# Patient Record
Sex: FEMALE | Race: WHITE | NOT HISPANIC OR LATINO | URBAN - METROPOLITAN AREA
[De-identification: names, ages, dates, MRNs, and addresses within clinical notes are randomized per-mention and may not be internally consistent; named-entity substitution may affect disease eponyms.]

---

## 2021-01-29 ENCOUNTER — APPOINTMENT (OUTPATIENT)
Dept: WOMENS IMAGING | Facility: HOSPITAL | Age: 31
End: 2021-01-29

## 2021-01-29 PROCEDURE — 77067 SCR MAMMO BI INCL CAD: CPT | Performed by: RADIOLOGY

## 2022-05-27 ENCOUNTER — TELEMEDICINE (OUTPATIENT)
Dept: FAMILY MEDICINE CLINIC | Facility: CLINIC | Age: 32
End: 2022-05-27

## 2022-05-27 DIAGNOSIS — R10.2 PELVIC PAIN: ICD-10-CM

## 2022-05-27 DIAGNOSIS — Z87.42 HISTORY OF OVARIAN CYST: ICD-10-CM

## 2022-05-27 DIAGNOSIS — R10.11 RUQ ABDOMINAL PAIN: Primary | ICD-10-CM

## 2022-05-27 PROBLEM — F17.200 TOBACCO DEPENDENCE SYNDROME: Status: ACTIVE | Noted: 2022-05-27

## 2022-05-27 PROCEDURE — 99215 OFFICE O/P EST HI 40 MIN: CPT | Performed by: FAMILY MEDICINE

## 2022-05-27 RX ORDER — ASCORBIC ACID, CHOLECALCIFEROL, .ALPHA.-TOCOPHEROL ACETATE, DL-, PYRIDOXINE HYDROCHLORIDE, FOLIC ACID, CYANOCOBALAMIN, BIOTIN, CALCIUM CARBONATE, FERROUS ASPARTO GLYCINATE, IRON, POTASSIUM IODIDE, MAGNESIUM OXIDE, DOCONEXENT AND LOWBUSH BLUEBERRY 60; 1000; 10; 26; 400; 13; 280; 80; 9; 9; 150; 25; 350; 25; 600 MG/1; [IU]/1; [IU]/1; MG/1; UG/1; UG/1; UG/1; MG/1; MG/1; MG/1; UG/1; MG/1; MG/1; MG/1; UG/1
CAPSULE, GELATIN COATED ORAL
COMMUNITY
Start: 2022-04-12 | End: 2022-12-06 | Stop reason: SDUPTHER

## 2022-05-27 RX ORDER — LEVONORGESTREL AND ETHINYL ESTRADIOL 0.1-0.02MG
KIT ORAL
COMMUNITY
Start: 2022-05-06

## 2022-05-31 NOTE — PROGRESS NOTES
Telehealth E-Visit      Date: 2022   Patient Name: Fanny Pizano  : 1990   MRN: [id number]     Chief Complaint:    Chief Complaint   Patient presents with   • Abdominal Pain     Telehealth Visit completed via My Chart for video conferencing, patient here for telehealth visit. Patient understands the limitations of the visit with telehealth given limitations in the exam findings but patient is agreeable to this telemedicine visit due to concerns with COVID 19 exposure if patient were to be present at the office at this time    I have reviewed the E-Visit questionnaire and the patient's answers, my assessment and plan are listed below.     History of Present Illness: Fanny Pizano is a 31 y.o. female who is complaining of intermittent right upper quadrant abdominal pain which is gotten worse.  She describes the pain to be in intermittent dull and achy there are some foods which seem to make it worse.  No vomiting some mild nausea and discomfort.  She is mostly concerned about gallstones.  Has no history of chronic NSAID use no history of stomach ulcers no melena no hematochezia or hematemesis.    She also has a history of ovarian cyst.  She notes right pelvic pain which has started over the past few weeks and has been intermittent she is on oral contraceptive pills which seems to keep her periods rather regular but her concern is that the history of the ovarian cyst could potentially with the pain correlate to her repetitive cyst being there.    Subjective      Review of Systems:   Review of Systems   Constitutional: Negative.    HENT: Negative.    Eyes: Negative.    Respiratory: Negative.    Cardiovascular: Negative.    Gastrointestinal: Positive for abdominal pain and nausea.   Endocrine: Negative.    Genitourinary: Positive for pelvic pain.   Musculoskeletal: Negative.    Skin: Negative.    Allergic/Immunologic: Negative.    Neurological: Negative.    Hematological: Negative.     Psychiatric/Behavioral: Negative.        I have reviewed and the following portions of the patient's history were updated as appropriate: past family history, past medical history, past social history, past surgical history and problem list.    Medications:     Current Outpatient Medications:   •  Falmina 0.1-20 MG-MCG per tablet, , Disp: , Rfl:   •  Multiple Minerals-Vitamins (DOLOMITE PLUS VITAMINS A AND D PO), Every 6 (Six) Hours., Disp: , Rfl:   •  Mufbtf-GfMak-XvSmu-Meth-FA-DHA (Prenate Mini) 18-0.6-0.4-350 MG capsule, , Disp: , Rfl:   •  sertraline (ZOLOFT) 50 MG tablet, , Disp: , Rfl:     Allergies:   No Known Allergies    Objective     Physical Exam:  Vital Signs: There were no vitals filed for this visit.  There is no height or weight on file to calculate BMI.    Physical Exam  Vitals reviewed: Exam is done and limited to telemedicine due to COVID.           Assessment / Plan      Assessment/Plan:   Diagnoses and all orders for this visit:    1. RUQ abdominal pain (Primary)  -     US Gallbladder; Future  -     Hemoglobin A1c; Future  -     Comprehensive Metabolic Panel; Future  -     CK; Future  -     T4, Free; Future  -     TSH; Future  -     Lipid Panel; Future  Patient does understand exam is limited as we are doing over telemedicine with what she is describing and with the location of the pain I suspect possible cholelithiasis we will get a gallbladder ultrasound and proceed with course if pain worsens in the meantime I have instructed her to go to the ER also get blood work and follow-up    2. Pelvic pain  -     US Non-ob Transvaginal; Future  With her history of ovarian cyst and pelvic pain we will get a transvaginal ultrasound to assess if there is a cyst may need to consider switching her to a different oral contraceptive pill in the meantime of course she has been advised there is always a concern for ovarian torsion to monitor the pain if it gets worse go to the ER.    3. History of ovarian  cyst  -     US Non-ob Transvaginal; Future         Follow Up:   No follow-ups on file.      45   minutes were spent reviewing the patient's questionnaire, formulating a treatment plan, and relaying information to the patient via [email or web address].    Ailyn Barraza DO  [facility]   05/31/22  08:24 EDT

## 2022-06-03 ENCOUNTER — OFFICE VISIT (OUTPATIENT)
Dept: FAMILY MEDICINE CLINIC | Facility: CLINIC | Age: 32
End: 2022-06-03

## 2022-06-03 VITALS
HEIGHT: 67 IN | BODY MASS INDEX: 33.81 KG/M2 | SYSTOLIC BLOOD PRESSURE: 134 MMHG | HEART RATE: 64 BPM | OXYGEN SATURATION: 98 % | DIASTOLIC BLOOD PRESSURE: 84 MMHG | WEIGHT: 215.4 LBS

## 2022-06-03 DIAGNOSIS — R10.11 RUQ ABDOMINAL PAIN: ICD-10-CM

## 2022-06-03 DIAGNOSIS — R10.2 PELVIC PAIN: ICD-10-CM

## 2022-06-03 DIAGNOSIS — Z00.00 ENCOUNTER FOR PREVENTATIVE ADULT HEALTH CARE EXAMINATION: Primary | ICD-10-CM

## 2022-06-03 DIAGNOSIS — Z87.42 HISTORY OF OVARIAN CYST: ICD-10-CM

## 2022-06-03 DIAGNOSIS — F33.1 MODERATE EPISODE OF RECURRENT MAJOR DEPRESSIVE DISORDER: ICD-10-CM

## 2022-06-03 PROCEDURE — 99395 PREV VISIT EST AGE 18-39: CPT | Performed by: FAMILY MEDICINE

## 2022-06-03 PROCEDURE — 36415 COLL VENOUS BLD VENIPUNCTURE: CPT | Performed by: FAMILY MEDICINE

## 2022-06-03 NOTE — PROGRESS NOTES
"Chief Complaint  Annual Exam    Subjective          Fanny Pizano presents to [facility] for preventative yearly exam.   Patient is a 31-year-old female who presents for physical exam she is up-to-date with Pap smear mammogram    And her last appointment we discussed a right upper quadrant abdominal pain she is pending getting scheduled for a gallbladder ultrasound she does endorse the pain is worse after eating certain meals.  She occasionally has some nausea no vomiting.    She is also describing some pelvic pain has a history of ovarian cyst she notes the pain is worse during her periods.  It is mostly localized to the left pelvic region.  She is on OCPs unsure if this is working appropriately        Objective   Vital Signs:   /84   Pulse 64   Ht 170.2 cm (67\")   Wt 97.7 kg (215 lb 6.4 oz)   SpO2 98%   BMI 33.74 kg/m²     Body mass index is 33.74 kg/m².    Predictive Model Details   No score data available for Risk of Fall        PHQ-9 Depression Screening  Little interest or pleasure in doing things?     Feeling down, depressed, or hopeless?     Trouble falling or staying asleep, or sleeping too much?     Feeling tired or having little energy?     Poor appetite or overeating?     Feeling bad about yourself - or that you are a failure or have let yourself or your family down?     Trouble concentrating on things, such as reading the newspaper or watching television?     Moving or speaking so slowly that other people could have noticed? Or the opposite - being so fidgety or restless that you have been moving around a lot more than usual?     Thoughts that you would be better off dead, or of hurting yourself in some way?     PHQ-9 Total Score     If you checked off any problems, how difficult have these problems made it for you to do your work, take care of things at home, or get along with other people?       Immunization History   Administered Date(s) Administered   • " COVID-19 (MODERNA) 1st, 2nd, 3rd Dose Only 06/09/2021, 07/07/2021   • DTaP [birth year], 02/08/1989, 11/21/1989, 08/04/1993   • Hepatitis B 07/16/1999, 08/13/1999, 01/11/2000   • IPV 1990, 1990, 1990, 08/04/1993   • Influenza, Unspecified 01/10/2019   • MMR 11/21/1989, 07/11/1999   • Td 07/07/2003   • Varicella 08/07/1995       Review of Systems   Constitutional: Negative.    HENT: Negative.    Eyes: Negative.    Respiratory: Negative.    Cardiovascular: Negative.    Gastrointestinal: Positive for abdominal pain and nausea.   Endocrine: Negative.    Genitourinary: Positive for pelvic pain and pelvic pressure.   Musculoskeletal: Negative.    Skin: Negative.    Allergic/Immunologic: Negative.    Neurological: Negative.    Hematological: Negative.    Psychiatric/Behavioral: Negative.        Past History:  Medical History: has a past medical history of Strabismus and Tobacco dependence syndrome.   Surgical History: has a past surgical history that includes Strabismus surgery.   Family History: family history includes Breast cancer in her paternal grandmother; Coronary artery disease in an other family member; Diabetes in an other family member; Graves' disease in her mother.   Social History: reports that she quit smoking about 4 years ago. She started smoking about 17 years ago. She has a 13.00 pack-year smoking history. She has never used smokeless tobacco. She reports that she does not use drugs.      Current Outpatient Medications:   •  Falmina 0.1-20 MG-MCG per tablet, , Disp: , Rfl:   •  Qkxzjx-GdXsq-NnDcn-Meth-FA-DHA (Prenate Mini) 18-0.6-0.4-350 MG capsule, , Disp: , Rfl:   •  sertraline (ZOLOFT) 50 MG tablet, , Disp: , Rfl:     Allergies: Patient has no known allergies.    Physical Exam  Constitutional:       Appearance: Normal appearance. She is normal weight.   HENT:      Head: Normocephalic and atraumatic.   Eyes:      Conjunctiva/sclera: Conjunctivae normal.   Cardiovascular:       Rate and Rhythm: Normal rate and regular rhythm.   Pulmonary:      Effort: Pulmonary effort is normal.      Breath sounds: Normal breath sounds.   Abdominal:      Comments: She does have some tenderness in the right upper quadrant region no evidence of acute abdomen no rebound guarding or rigidity.  Negative Guo sign.   Musculoskeletal:         General: Normal range of motion.      Cervical back: Normal range of motion and neck supple.   Skin:     General: Skin is warm and dry.   Neurological:      General: No focal deficit present.      Mental Status: She is alert and oriented to person, place, and time. Mental status is at baseline.   Psychiatric:         Mood and Affect: Mood normal.         Behavior: Behavior normal.         Thought Content: Thought content normal.         Judgment: Judgment normal.          Result Review :              Counseling/anticipatory guidance:  Patient has been counseled on nutrition, Family planning/contraception, physical activity, health and weight, injury prevention, misuse of tobacco, alcohol and drugs, sexual behavior, dental health, mental health, immunizations and screening.  Patient has been given counseling and guidance on the importance of breast cancer and self breast exams.     Assessment and Plan    Diagnoses and all orders for this visit:    1. Encounter for preventative adult health care examination (Primary)  -     Hemoglobin A1c; Future  -     Comprehensive Metabolic Panel; Future  -     CK; Future  -     CBC Auto Differential; Future  -     TSH; Future  -     T4, Free; Future  -     Lipid Panel; Future  -     Lipid Panel  -     T4, Free  -     TSH  -     CBC Auto Differential  -     CK  -     Comprehensive Metabolic Panel  -     Hemoglobin A1c  Basic blood work and follow-up    2. RUQ abdominal pain  She is pending ultrasound right upper quadrant gallbladder we will follow-up with this if there is any evidence of gallstones along with her abdominal pain we will  then proceed to refer her to surgery to discuss removal    3. Pelvic pain  We will do a transvaginal ultrasound if there is any evidence of ovarian cyst we will need to monitor if not may consider changing her OCP    4. History of ovarian cyst  As above  She has however been told of any sharp stabbing pelvic pain that does not improve go to the ER    5. Moderate episode of recurrent major depressive disorder (HCC)  Stable continue meds and monitoring      PATIENT HAS BEEN ADVISED WHILE ON NEW MEDICATION PRESCRIBED IT IS IMPORTANT TO USE BACK UP CONTRACEPTION OR BACK UP BIRTH CONTROL AS THE USE OF THIS MEDICATION WITH PREGNANCY COULD CAUSE POTENTIAL RISKS IF PATIENT DOES BECOME PREGNANT.    MEDICATION SIDE EFFECTS AND RISKS HAVE BEEN DISCUSSED WITH PATIENT. PATIENT NOTES UNDERSTANDING. IF ANY CONCERN OR QUESTION REGARDING MEDICATION PLEASE CONTACT.         Follow Up   No follow-ups on file.  Patient was given instructions and counseling regarding her condition or for health maintenance advice. Please see specific information pulled into the AVS if appropriate.     Ailyn Barraza DO

## 2022-06-04 LAB
ALBUMIN SERPL-MCNC: 4.6 G/DL (ref 3.8–4.8)
ALBUMIN/GLOB SERPL: 1.8 {RATIO} (ref 1.2–2.2)
ALP SERPL-CCNC: 53 IU/L (ref 44–121)
ALT SERPL-CCNC: 18 IU/L (ref 0–32)
AST SERPL-CCNC: 17 IU/L (ref 0–40)
BASOPHILS # BLD AUTO: 0.1 X10E3/UL (ref 0–0.2)
BASOPHILS NFR BLD AUTO: 1 %
BILIRUB SERPL-MCNC: 0.3 MG/DL (ref 0–1.2)
BUN SERPL-MCNC: 13 MG/DL (ref 6–20)
BUN/CREAT SERPL: 15 (ref 9–23)
CALCIUM SERPL-MCNC: 9 MG/DL (ref 8.7–10.2)
CHLORIDE SERPL-SCNC: 106 MMOL/L (ref 96–106)
CHOLEST SERPL-MCNC: 234 MG/DL (ref 100–199)
CK SERPL-CCNC: 167 U/L (ref 32–182)
CO2 SERPL-SCNC: 21 MMOL/L (ref 20–29)
CREAT SERPL-MCNC: 0.84 MG/DL (ref 0.57–1)
EGFRCR SERPLBLD CKD-EPI 2021: 94 ML/MIN/1.73
EOSINOPHIL # BLD AUTO: 0.1 X10E3/UL (ref 0–0.4)
EOSINOPHIL NFR BLD AUTO: 1 %
ERYTHROCYTE [DISTWIDTH] IN BLOOD BY AUTOMATED COUNT: 11.7 % (ref 11.7–15.4)
GLOBULIN SER CALC-MCNC: 2.5 G/DL (ref 1.5–4.5)
GLUCOSE SERPL-MCNC: 83 MG/DL (ref 65–99)
HBA1C MFR BLD: 5.5 % (ref 4.8–5.6)
HCT VFR BLD AUTO: 45.1 % (ref 34–46.6)
HDLC SERPL-MCNC: 38 MG/DL
HGB BLD-MCNC: 14.9 G/DL (ref 11.1–15.9)
IMM GRANULOCYTES # BLD AUTO: 0 X10E3/UL (ref 0–0.1)
IMM GRANULOCYTES NFR BLD AUTO: 0 %
LDLC SERPL CALC-MCNC: 176 MG/DL (ref 0–99)
LYMPHOCYTES # BLD AUTO: 2.4 X10E3/UL (ref 0.7–3.1)
LYMPHOCYTES NFR BLD AUTO: 34 %
MCH RBC QN AUTO: 31.7 PG (ref 26.6–33)
MCHC RBC AUTO-ENTMCNC: 33 G/DL (ref 31.5–35.7)
MCV RBC AUTO: 96 FL (ref 79–97)
MONOCYTES # BLD AUTO: 0.3 X10E3/UL (ref 0.1–0.9)
MONOCYTES NFR BLD AUTO: 5 %
NEUTROPHILS # BLD AUTO: 4.1 X10E3/UL (ref 1.4–7)
NEUTROPHILS NFR BLD AUTO: 59 %
PLATELET # BLD AUTO: 278 X10E3/UL (ref 150–450)
POTASSIUM SERPL-SCNC: 4.4 MMOL/L (ref 3.5–5.2)
PROT SERPL-MCNC: 7.1 G/DL (ref 6–8.5)
RBC # BLD AUTO: 4.7 X10E6/UL (ref 3.77–5.28)
SODIUM SERPL-SCNC: 140 MMOL/L (ref 134–144)
T4 FREE SERPL-MCNC: 1.35 NG/DL (ref 0.82–1.77)
TRIGL SERPL-MCNC: 110 MG/DL (ref 0–149)
TSH SERPL DL<=0.005 MIU/L-ACNC: 1.21 UIU/ML (ref 0.45–4.5)
VLDLC SERPL CALC-MCNC: 20 MG/DL (ref 5–40)
WBC # BLD AUTO: 7 X10E3/UL (ref 3.4–10.8)

## 2022-06-06 DIAGNOSIS — R10.11 RUQ ABDOMINAL PAIN: ICD-10-CM

## 2022-06-07 ENCOUNTER — OFFICE VISIT (OUTPATIENT)
Dept: FAMILY MEDICINE CLINIC | Facility: CLINIC | Age: 32
End: 2022-06-07

## 2022-06-07 VITALS
DIASTOLIC BLOOD PRESSURE: 84 MMHG | HEART RATE: 82 BPM | HEIGHT: 67 IN | BODY MASS INDEX: 33.62 KG/M2 | OXYGEN SATURATION: 98 % | WEIGHT: 214.2 LBS | SYSTOLIC BLOOD PRESSURE: 122 MMHG

## 2022-06-07 DIAGNOSIS — K76.0 HEPATIC STEATOSIS: Primary | ICD-10-CM

## 2022-06-07 DIAGNOSIS — E78.2 MIXED HYPERLIPIDEMIA: ICD-10-CM

## 2022-06-07 DIAGNOSIS — K59.04 CHRONIC IDIOPATHIC CONSTIPATION: ICD-10-CM

## 2022-06-07 PROCEDURE — 99214 OFFICE O/P EST MOD 30 MIN: CPT | Performed by: FAMILY MEDICINE

## 2022-06-07 RX ORDER — ATORVASTATIN CALCIUM 40 MG/1
40 TABLET, FILM COATED ORAL DAILY
Qty: 90 TABLET | Refills: 2 | Status: SHIPPED | OUTPATIENT
Start: 2022-06-07 | End: 2022-09-14

## 2022-06-07 NOTE — PROGRESS NOTES
"Chief Complaint  CT Follow up    Subjective          Fanny Pizano presents to [facility]  Patient is a 31-year-old female who presents for imaging follow-up.  At her last appointment we had discussed her right upper quadrant abdominal pain we had considered cholelithiasis she does note that her  had hepatic steatosis along with her mother and grandmother.  She does admit to some mild carbohydrate intake but not excessive.  She is wanting to think about pregnancy in the next 3 to 6 months.  She was wanting to lose some weight on her own.    She struggles with significant constipation has tried over-the-counter medications which does not seem to help.      Objective   Vital Signs:   /84   Pulse 82   Ht 170.2 cm (67\")   Wt 97.2 kg (214 lb 3.2 oz)   SpO2 98%   BMI 33.55 kg/m²     Body mass index is 33.55 kg/m².    Review of Systems   Constitutional: Negative.    HENT: Negative.    Eyes: Negative.    Respiratory: Negative.    Cardiovascular: Negative.    Gastrointestinal: Positive for abdominal distention and abdominal pain.   Endocrine: Negative.    Genitourinary: Negative.    Musculoskeletal: Negative.    Skin: Negative.    Allergic/Immunologic: Negative.    Neurological: Negative.    Hematological: Negative.    Psychiatric/Behavioral: Negative.        Past History:  Medical History: has a past medical history of Strabismus and Tobacco dependence syndrome.   Surgical History: has a past surgical history that includes Strabismus surgery.   Family History: family history includes Breast cancer in her paternal grandmother; Coronary artery disease in an other family member; Diabetes in an other family member; Graves' disease in her mother.   Social History: reports that she quit smoking about 4 years ago. She started smoking about 17 years ago. She has a 13.00 pack-year smoking history. She has never used smokeless tobacco. She reports previous alcohol use. She reports " that she does not use drugs.      Current Outpatient Medications:   •  Falmina 0.1-20 MG-MCG per tablet, , Disp: , Rfl:   •  Pytjdw-YwFov-UgXek-Meth-FA-DHA (Prenate Mini) 18-0.6-0.4-350 MG capsule, , Disp: , Rfl:   •  sertraline (ZOLOFT) 50 MG tablet, , Disp: , Rfl:   •  atorvastatin (Lipitor) 40 MG tablet, Take 1 tablet by mouth Daily., Disp: 90 tablet, Rfl: 2  •  linaclotide (Linzess) 72 MCG capsule capsule, Take 1 capsule by mouth Every Morning Before Breakfast., Disp: 30 capsule, Rfl: 3    Allergies: Patient has no known allergies.    Physical Exam  Constitutional:       Appearance: Normal appearance. She is normal weight.   HENT:      Head: Normocephalic and atraumatic.   Eyes:      Conjunctiva/sclera: Conjunctivae normal.   Cardiovascular:      Rate and Rhythm: Normal rate and regular rhythm.   Pulmonary:      Effort: Pulmonary effort is normal.      Breath sounds: Normal breath sounds.   Musculoskeletal:         General: Normal range of motion.      Cervical back: Normal range of motion and neck supple.   Skin:     General: Skin is warm and dry.   Neurological:      General: No focal deficit present.      Mental Status: She is alert and oriented to person, place, and time. Mental status is at baseline.   Psychiatric:         Mood and Affect: Mood normal.         Behavior: Behavior normal.         Thought Content: Thought content normal.         Judgment: Judgment normal.          Result Review :                   Assessment and Plan    Diagnoses and all orders for this visit:    1. Hepatic steatosis (Primary)  Have discussed with patient evidence of mild hepatic steatosis at this time.  No evidence of cholelithiasis.  I have strongly recommended modifying her diet addition of exercise.  She does have significantly elevated LDL I suspect this is secondary to dietary intake along with family history.  She also did discuss wanting to get pregnant in the next 3 to 6 months we discussed putting that off till about  3 months therefore doing a 3-month trial of a statin ensuring that she stays on her birth control was discussed.  She is okay with this to help bring her LDL down along with the dietary modifications.  We will plan on rechecking her blood work in 3 months and reassess.  At that time if everything looks better then she will attempt to get pregnant.  In the meantime she has been told to watch for symptoms of any sharp stabbing abdominal pain return for evaluation    2. Mixed hyperlipidemia  As above    3. Chronic idiopathic constipation  We will initiate trial of Linzess for her to try.    Other orders  -     linaclotide (Linzess) 72 MCG capsule capsule; Take 1 capsule by mouth Every Morning Before Breakfast.  Dispense: 30 capsule; Refill: 3  -     atorvastatin (Lipitor) 40 MG tablet; Take 1 tablet by mouth Daily.  Dispense: 90 tablet; Refill: 2      PATIENT HAS BEEN ADVISED WHILE ON NEW MEDICATION PRESCRIBED IT IS IMPORTANT TO USE BACK UP CONTRACEPTION OR BACK UP BIRTH CONTROL AS THE USE OF THIS MEDICATION WITH PREGNANCY COULD CAUSE POTENTIAL RISKS IF PATIENT DOES BECOME PREGNANT.    MEDICATION SIDE EFFECTS AND RISKS HAVE BEEN DISCUSSED WITH PATIENT. PATIENT NOTES UNDERSTANDING. IF ANY CONCERN OR QUESTION REGARDING MEDICATION PLEASE CONTACT.     Follow Up   No follow-ups on file.  Patient was given instructions and counseling regarding her condition or for health maintenance advice. Please see specific information pulled into the AVS if appropriate.     Ailyn Barraza DO

## 2022-09-07 ENCOUNTER — LAB (OUTPATIENT)
Dept: FAMILY MEDICINE CLINIC | Facility: CLINIC | Age: 32
End: 2022-09-07

## 2022-09-07 DIAGNOSIS — Z79.899 ENCOUNTER FOR LONG-TERM (CURRENT) USE OF OTHER MEDICATIONS: Primary | ICD-10-CM

## 2022-09-07 DIAGNOSIS — Z11.59 ENCOUNTER FOR HEPATITIS C SCREENING TEST FOR LOW RISK PATIENT: ICD-10-CM

## 2022-09-07 PROCEDURE — 36415 COLL VENOUS BLD VENIPUNCTURE: CPT | Performed by: FAMILY MEDICINE

## 2022-09-08 LAB
ALBUMIN SERPL-MCNC: 4.4 G/DL (ref 3.8–4.8)
ALBUMIN/GLOB SERPL: 2.1 {RATIO} (ref 1.2–2.2)
ALP SERPL-CCNC: 59 IU/L (ref 44–121)
ALT SERPL-CCNC: 25 IU/L (ref 0–32)
AST SERPL-CCNC: 23 IU/L (ref 0–40)
BASOPHILS # BLD AUTO: 0.1 X10E3/UL (ref 0–0.2)
BASOPHILS NFR BLD AUTO: 1 %
BILIRUB SERPL-MCNC: 0.2 MG/DL (ref 0–1.2)
BUN SERPL-MCNC: 9 MG/DL (ref 6–20)
BUN/CREAT SERPL: 10 (ref 9–23)
CALCIUM SERPL-MCNC: 9.2 MG/DL (ref 8.7–10.2)
CHLORIDE SERPL-SCNC: 104 MMOL/L (ref 96–106)
CHOLEST SERPL-MCNC: 109 MG/DL (ref 100–199)
CO2 SERPL-SCNC: 20 MMOL/L (ref 20–29)
CREAT SERPL-MCNC: 0.89 MG/DL (ref 0.57–1)
EGFRCR-CYS SERPLBLD CKD-EPI 2021: 87 ML/MIN/1.73
EOSINOPHIL # BLD AUTO: 0.2 X10E3/UL (ref 0–0.4)
EOSINOPHIL NFR BLD AUTO: 2 %
ERYTHROCYTE [DISTWIDTH] IN BLOOD BY AUTOMATED COUNT: 11.8 % (ref 11.7–15.4)
GLOBULIN SER CALC-MCNC: 2.1 G/DL (ref 1.5–4.5)
GLUCOSE SERPL-MCNC: 91 MG/DL (ref 65–99)
HBA1C MFR BLD: 5.6 % (ref 4.8–5.6)
HCT VFR BLD AUTO: 41.9 % (ref 34–46.6)
HCV AB S/CO SERPL IA: <0.1 S/CO RATIO (ref 0–0.9)
HDLC SERPL-MCNC: 38 MG/DL
HGB BLD-MCNC: 14.2 G/DL (ref 11.1–15.9)
IMM GRANULOCYTES # BLD AUTO: 0 X10E3/UL (ref 0–0.1)
IMM GRANULOCYTES NFR BLD AUTO: 0 %
LDLC SERPL CALC-MCNC: 56 MG/DL (ref 0–99)
LYMPHOCYTES # BLD AUTO: 2.4 X10E3/UL (ref 0.7–3.1)
LYMPHOCYTES NFR BLD AUTO: 24 %
MCH RBC QN AUTO: 32.1 PG (ref 26.6–33)
MCHC RBC AUTO-ENTMCNC: 33.9 G/DL (ref 31.5–35.7)
MCV RBC AUTO: 95 FL (ref 79–97)
MONOCYTES # BLD AUTO: 0.5 X10E3/UL (ref 0.1–0.9)
MONOCYTES NFR BLD AUTO: 5 %
NEUTROPHILS # BLD AUTO: 6.7 X10E3/UL (ref 1.4–7)
NEUTROPHILS NFR BLD AUTO: 68 %
PLATELET # BLD AUTO: 240 X10E3/UL (ref 150–450)
POTASSIUM SERPL-SCNC: 4.5 MMOL/L (ref 3.5–5.2)
PROT SERPL-MCNC: 6.5 G/DL (ref 6–8.5)
RBC # BLD AUTO: 4.42 X10E6/UL (ref 3.77–5.28)
SODIUM SERPL-SCNC: 139 MMOL/L (ref 134–144)
T4 FREE SERPL-MCNC: 1.44 NG/DL (ref 0.82–1.77)
TRIGL SERPL-MCNC: 70 MG/DL (ref 0–149)
TSH SERPL DL<=0.005 MIU/L-ACNC: 1.71 UIU/ML (ref 0.45–4.5)
VLDLC SERPL CALC-MCNC: 15 MG/DL (ref 5–40)
WBC # BLD AUTO: 9.8 X10E3/UL (ref 3.4–10.8)

## 2022-09-14 ENCOUNTER — OFFICE VISIT (OUTPATIENT)
Dept: FAMILY MEDICINE CLINIC | Facility: CLINIC | Age: 32
End: 2022-09-14

## 2022-09-14 VITALS
SYSTOLIC BLOOD PRESSURE: 127 MMHG | OXYGEN SATURATION: 97 % | HEART RATE: 88 BPM | WEIGHT: 211.2 LBS | HEIGHT: 67 IN | DIASTOLIC BLOOD PRESSURE: 82 MMHG | BODY MASS INDEX: 33.15 KG/M2

## 2022-09-14 DIAGNOSIS — L30.8 OTHER ECZEMA: ICD-10-CM

## 2022-09-14 DIAGNOSIS — F41.1 GAD (GENERALIZED ANXIETY DISORDER): ICD-10-CM

## 2022-09-14 DIAGNOSIS — E78.2 MIXED HYPERLIPIDEMIA: Primary | ICD-10-CM

## 2022-09-14 PROCEDURE — 99214 OFFICE O/P EST MOD 30 MIN: CPT | Performed by: FAMILY MEDICINE

## 2022-09-14 NOTE — PROGRESS NOTES
"Chief Complaint  Hyperlipidemia and spot in rt ankle     Subjective          Fanny Pizano presents to [facility]  Patient is a 32-year-old female who presents for follow-up    At her last appointment we discussed her evidence of nonalcoholic fatty liver disease her LDL was significantly elevated we put her on a 3-month trial of a statin and she is here for follow-up she has also attempted to walk and lose weight as well.    She is wanting to attempt pregnancy in the next few months and want to discuss what medication she should get off of    She also has generalized anxiety disorder would like to see if she can get a note to allow her to continue to work from home    She also has an area of eczema on the left lateral part of the ankle has tried over-the-counter medications with no relief      Objective   Vital Signs:   /82   Pulse 88   Ht 170.2 cm (67.01\")   Wt 95.8 kg (211 lb 3.2 oz)   SpO2 97%   BMI 33.07 kg/m²     Body mass index is 33.07 kg/m².    Review of Systems   Constitutional: Negative.    HENT: Negative.    Eyes: Negative.    Respiratory: Negative.    Cardiovascular: Negative.    Gastrointestinal: Negative.    Endocrine: Negative.    Genitourinary: Negative.    Musculoskeletal: Negative.    Skin: Positive for dry skin.   Allergic/Immunologic: Negative.    Neurological: Negative.    Hematological: Negative.    Psychiatric/Behavioral: The patient is nervous/anxious.        Past History:  Medical History: has a past medical history of Strabismus and Tobacco dependence syndrome.   Surgical History: has a past surgical history that includes Strabismus surgery.   Family History: family history includes Breast cancer in her paternal grandmother; Coronary artery disease in an other family member; Diabetes in an other family member; Graves' disease in her mother.   Social History: reports that she quit smoking about 4 years ago. She started smoking about 17 years ago. " She has a 13.00 pack-year smoking history. She has never used smokeless tobacco. She reports previous alcohol use. She reports that she does not use drugs.      Current Outpatient Medications:   •  Falmina 0.1-20 MG-MCG per tablet, , Disp: , Rfl:   •  Nxzdxc-MgLlo-DgXei-Meth-FA-DHA (Prenate Mini) 18-0.6-0.4-350 MG capsule, , Disp: , Rfl:   •  sertraline (ZOLOFT) 50 MG tablet, , Disp: , Rfl:   •  triamcinolone (KENALOG) 0.1 % ointment, Apply 1 application topically to the appropriate area as directed 2 (Two) Times a Day., Disp: 4 g, Rfl: 0    Allergies: Patient has no known allergies.    Physical Exam  Constitutional:       Appearance: Normal appearance. She is normal weight.   HENT:      Head: Normocephalic and atraumatic.   Eyes:      Conjunctiva/sclera: Conjunctivae normal.   Cardiovascular:      Rate and Rhythm: Normal rate and regular rhythm.   Pulmonary:      Effort: Pulmonary effort is normal.      Breath sounds: Normal breath sounds.   Musculoskeletal:         General: Normal range of motion.      Cervical back: Normal range of motion and neck supple.   Skin:     Comments: Left eczema patch on ankle   Neurological:      General: No focal deficit present.      Mental Status: She is alert and oriented to person, place, and time. Mental status is at baseline.   Psychiatric:         Mood and Affect: Mood normal.         Behavior: Behavior normal.         Thought Content: Thought content normal.         Judgment: Judgment normal.          Result Review :                   Assessment and Plan    Diagnoses and all orders for this visit:    1. Mixed hyperlipidemia (Primary)  For now her LDL has come down to 56 we will have her discontinue the statin and maintain from a dietary standpoint she is wanting to initiate her think about pregnancy we will repeat in 3 months    2. KAMALA (generalized anxiety disorder)  Stable continue meds and monitor    3. Other eczema  Topical triamcinolone    Other orders  -     triamcinolone  (KENALOG) 0.1 % ointment; Apply 1 application topically to the appropriate area as directed 2 (Two) Times a Day.  Dispense: 4 g; Refill: 0    MEDICATION SIDE EFFECTS, RISKS AND SIDE EFFECTS HAVE BEEN DISCUSSED WITH PATIENT. PATIENT NOTES UNDERSTANDING. IF ANY CONCERN OR QUESTION REGARDING MEDICATION PLEASE CONTACT.       Follow Up   No follow-ups on file.  Patient was given instructions and counseling regarding her condition or for health maintenance advice. Please see specific information pulled into the AVS if appropriate.     Ailyn Barraza DO  Answers for HPI/ROS submitted by the patient on 9/13/2022  Please describe your symptoms.: Follow up bloodwork, look at place on ankle, discuss stopping statin wanting ti have child, note for telework  Have you had these symptoms before?: No  How long have you been having these symptoms?: Greater than 2 weeks  Please list any medications you are currently taking for this condition.: Birth control, Statin, Prenatal , Aniexty med  What is the primary reason for your visit?: Other

## 2022-10-24 ENCOUNTER — TELEPHONE (OUTPATIENT)
Dept: FAMILY MEDICINE CLINIC | Facility: CLINIC | Age: 32
End: 2022-10-24

## 2022-10-24 NOTE — TELEPHONE ENCOUNTER
Caller: Fanny Pizano    Relationship: Self     Best call back number:      [phone]    OR PUT INFORMATION INTO [employer] MY CHART     What form or medical record are you requesting:  ADA   PATIENT IS WANTING TO CONTINUE TO WORK FROM HOME      Additional notes:  PATIENT WANTS TO KNOW IF THERE IS A CHARGE TO FILL THE ADA FORM OUT AND WHAT TIME LINE WILL THERE BE TO GET THIS STARTED     PATIENT IS GOING TO FAX THE ADA PAPERWORK TO THE OFFICE AS SOON AS SHE GETS IT          No

## 2022-10-31 NOTE — TELEPHONE ENCOUNTER
Caller: Fanny Pizano    Relationship: Self    Best call back number: [phone] OK TO LEAVE DETAILED MESSAGE IF NO ANSWER    What is the best time to reach you: ANY    Who are you requesting to speak with (clinical staff, provider,  specific staff member): ANY    Do you know the name of the person who called:     What was the call regarding: PAPERWORK WAS DROPPED OFF LAST WEDNESDAY. EMPLOYER HAS NOT RECEIVED YET. PLEASE LOCATE, COMPLETE, SIGN, AND FAX TO EMPLOYER. PLEASE MAIL ORIGINAL TO PATIENT AND OR UPLOAD TO [employer]. THIS MUST BE FAXED TO EMPLOYER- PATIENT IS NOT ABLE TO . THE FAX AND PHONE WAS PROVIDED WITH PAPERWORK WHEN DROPPED OFF 10/24/22 AND SPOKE TO KLARISSA ON Wednesday 10/26/22 AFTERNOON. PLEASE CALL IF ANY ISSUES. PAPERWORK IS TO GET TO WORK FROM HOME AND PATIENT IS ALREADY WORKING FROM HOME. PAPERWORK MUST BE TURNED IN AS SOON AS POSSIBLE.    Do you require a callback: AS NEEDED. PATIENT WOULD LIKE A CALL BACK TO CONFIRM WHEN COMPLETED SO SHE CAN FOLLOW UP WITH EMPLOYER.

## 2022-11-02 NOTE — TELEPHONE ENCOUNTER
paper work is on her desk next in line. It's possible she might need a v-v so we can work this correctly. Waiting on Dr Barraza to make the call.

## 2022-11-08 ENCOUNTER — TELEMEDICINE (OUTPATIENT)
Dept: FAMILY MEDICINE CLINIC | Facility: CLINIC | Age: 32
End: 2022-11-08

## 2022-11-08 DIAGNOSIS — G43.709 CHRONIC MIGRAINE WITHOUT AURA WITHOUT STATUS MIGRAINOSUS, NOT INTRACTABLE: ICD-10-CM

## 2022-11-08 DIAGNOSIS — F41.1 GAD (GENERALIZED ANXIETY DISORDER): Primary | ICD-10-CM

## 2022-11-08 DIAGNOSIS — K58.2 IRRITABLE BOWEL SYNDROME WITH BOTH CONSTIPATION AND DIARRHEA: ICD-10-CM

## 2022-11-08 DIAGNOSIS — F41.0 PANIC ATTACKS: ICD-10-CM

## 2022-11-08 PROCEDURE — 99214 OFFICE O/P EST MOD 30 MIN: CPT | Performed by: FAMILY MEDICINE

## 2022-11-08 RX ORDER — CETIRIZINE HYDROCHLORIDE 10 MG/1
TABLET ORAL
COMMUNITY
Start: 2022-10-08

## 2022-11-08 RX ORDER — SUMATRIPTAN 50 MG/1
TABLET, FILM COATED ORAL
Qty: 10 TABLET | Refills: 0 | Status: SHIPPED | OUTPATIENT
Start: 2022-11-08

## 2022-11-08 NOTE — PROGRESS NOTES
Telehealth E-Visit      Date: 2022   Patient Name: Fanny Piazno  : 1990   MRN: [id number]     Chief Complaint:    Chief Complaint   Patient presents with   • Paper Work      Telehealth Visit completed via MY CHART for video conferencing, patient here for telehealth visit. Patient understands the limitations of the visit with telehealth given limitations in the exam findings but patient is agreeable to this telemedicine visit due to concerns with COVID 19 exposure if patient were to be present at the office at this time    Patient location: At home  Provider location: [facility]  Patient has chosen to receive care through telemedicine the patient does give consent to use video audio for medical care today.    I have reviewed the E-Visit questionnaire and the patient's answers, my assessment and plan are listed below.     History of Present Illness: Fanny Pizano is a 32 y.o. female who is here today to follow up on her anxiety.  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  This is started to cause flareups of her IBS symptoms which is also contributing reason why she would like to continue to work from home.  She also has a known history of migraine she was previously on as needed Imitrex return avoid Tylenol with her due to her fatty liver she requested this as she has been having more migraines throughout the nighttime.      Subjective      Review of Systems:   Review of Systems   Constitutional: Negative.    HENT: Negative.    Eyes: Negative.    Respiratory: Negative.    Cardiovascular: Negative.    Gastrointestinal: Negative.    Endocrine: Negative.    Genitourinary: Negative.    Musculoskeletal: Negative.    Skin: Negative.     Allergic/Immunologic: Negative.    Neurological: Negative.    Hematological: Negative.    Psychiatric/Behavioral: The patient is nervous/anxious.        I have reviewed and the following portions of the patient's history were updated as appropriate: past family history, past medical history, past social history, past surgical history and problem list.    Medications:     Current Outpatient Medications:   •  cetirizine (zyrTEC) 10 MG tablet, , Disp: , Rfl:   •  Falmina 0.1-20 MG-MCG per tablet, , Disp: , Rfl:   •  Botwyr-CgEio-PhNxd-Meth-FA-DHA (Prenate Mini) 18-0.6-0.4-350 MG capsule, , Disp: , Rfl:   •  sertraline (ZOLOFT) 50 MG tablet, , Disp: , Rfl:   •  triamcinolone (KENALOG) 0.1 % ointment, Apply 1 application topically to the appropriate area as directed 2 (Two) Times a Day., Disp: 4 g, Rfl: 0  •  SUMAtriptan (Imitrex) 50 MG tablet, Take one tablet at onset of headache. May repeat dose one time in 2 hours if headache not relieved., Disp: 10 tablet, Rfl: 0    Allergies:   No Known Allergies    Objective     Physical Exam:  Vital Signs: There were no vitals filed for this visit.  There is no height or weight on file to calculate BMI.    Physical Exam  Vitals reviewed: EXAM IS DONE AND LIMITED TO TELEMEDICIEN DUE TO COVID.           Assessment / Plan      Assessment/Plan:   Diagnoses and all orders for this visit:    1. KAMALA (generalized anxiety disorder) (Primary)  I have discussed with patient I agree that she would better benefit from working from home continue current medications I will fill out documentation to help her continue to order    2. Panic attacks  As above    3. Irritable bowel syndrome with both constipation and diarrhea  Triggered by her anxiety    4. Chronic migraine without aura without status migrainosus, not intractable  Given as needed Imitrex avoid using this when trying to get pregnant risks and side effects advised with patient      Other orders  -     SUMAtriptan (Imitrex) 50 MG  tablet; Take one tablet at onset of headache. May repeat dose one time in 2 hours if headache not relieved.  Dispense: 10 tablet; Refill: 0     PATIENT HAS BEEN ADVISED WHILE ON NEW MEDICATION PRESCRIBED IT IS IMPORTANT TO USE BACK UP CONTRACEPTION OR BACK UP BIRTH CONTROL AS THE USE OF THIS MEDICATION WITH PREGNANCY COULD CAUSE POTENTIAL RISKS IF PATIENT DOES BECOME PREGNANT.    MEDICATION SIDE EFFECTS AND RISKS HAVE BEEN DISCUSSED WITH PATIENT. PATIENT NOTES UNDERSTANDING. IF ANY CONCERN OR QUESTION REGARDING MEDICATION PLEASE CONTACT.     Follow Up:   No follow-ups on file.      45 minutes were spent reviewing the patient's questionnaire, formulating a treatment plan, and relaying information to the patient via [employer].    Ailyn Barraza DO  [facility]   11/08/22  13:59 EST

## 2022-11-18 ENCOUNTER — TELEPHONE (OUTPATIENT)
Dept: FAMILY MEDICINE CLINIC | Facility: CLINIC | Age: 32
End: 2022-11-18

## 2022-11-18 NOTE — TELEPHONE ENCOUNTER
Patient states that her employer said that they need more details on the ADA paperwork.  They said the IBS, headaches, and sleepiness were not mentioned and they need more details.  She said that they told her that Dr Barraza can add the details to the paperwork that was already filled out. She would like to know if she needs another appointment or is the one she had already sufficient. She would like a call back. Ph: [phone]

## 2022-11-29 NOTE — TELEPHONE ENCOUNTER
Patient called again today about ADA paperwork.  She also sent a detailed message through [employer] on 11/28.

## 2022-12-01 NOTE — TELEPHONE ENCOUNTER
Patient called about her ADA paperwork.  I reprinted the forms and made a note and put it in the providers box.  If there are any questions about what needs to be done you can refer to messages or contact patient.  She will need this done as soon as possible. Thank you

## 2022-12-01 NOTE — TELEPHONE ENCOUNTER
PATIENT WAS CALLING TO CHECK ON THE STATUS OF HER ADA PAPERWORK. PATIENT ADVISED BY HER EMPLOYER THAT ADDITIONAL INFO WAS NEEDED. [employer] TRANSFERRED.     PLEASE ADVISE.

## 2022-12-06 ENCOUNTER — TELEMEDICINE (OUTPATIENT)
Dept: FAMILY MEDICINE CLINIC | Facility: CLINIC | Age: 32
End: 2022-12-06

## 2022-12-06 DIAGNOSIS — K58.2 IRRITABLE BOWEL SYNDROME WITH BOTH CONSTIPATION AND DIARRHEA: ICD-10-CM

## 2022-12-06 DIAGNOSIS — G44.89 OTHER HEADACHE SYNDROME: ICD-10-CM

## 2022-12-06 DIAGNOSIS — F41.1 GAD (GENERALIZED ANXIETY DISORDER): Primary | ICD-10-CM

## 2022-12-06 DIAGNOSIS — R41.840 CONCENTRATION DEFICIT: ICD-10-CM

## 2022-12-06 PROCEDURE — 99214 OFFICE O/P EST MOD 30 MIN: CPT | Performed by: FAMILY MEDICINE

## 2022-12-06 RX ORDER — ASCORBIC ACID, CHOLECALCIFEROL, .ALPHA.-TOCOPHEROL ACETATE, DL-, PYRIDOXINE HYDROCHLORIDE, FOLIC ACID, CYANOCOBALAMIN, BIOTIN, CALCIUM CARBONATE, FERROUS ASPARTO GLYCINATE, IRON, POTASSIUM IODIDE, MAGNESIUM OXIDE, DOCONEXENT AND LOWBUSH BLUEBERRY 60; 1000; 10; 26; 400; 13; 280; 80; 9; 9; 150; 25; 350; 25; 600 MG/1; [IU]/1; [IU]/1; MG/1; UG/1; UG/1; UG/1; MG/1; MG/1; MG/1; UG/1; MG/1; MG/1; MG/1; UG/1
1 CAPSULE, GELATIN COATED ORAL DAILY
Qty: 90 CAPSULE | Refills: 0 | Status: SHIPPED | OUTPATIENT
Start: 2022-12-06

## 2022-12-06 NOTE — PROGRESS NOTES
Telehealth E-Visit      Date: 2022   Patient Name: Fanny Pizano  : 1990   MRN: [id number]     Chief Complaint:    Chief Complaint   Patient presents with   • Paper Work      Telehealth Visit completed via MY CHART for video conferencing, patient here for telehealth visit. Patient understands the limitations of the visit with telehealth given limitations in the exam findings but patient is agreeable to this telemedicine visit due to concerns with COVID 19 exposure if patient were to be present at the office at this time    Patient location: At home  Provider location: [facility]  Patient has chosen to receive care through telemedicine the patient does give consent to use video audio for medical care today.      I have reviewed the E-Visit questionnaire and the patient's answers, my assessment and plan are listed below.     History of Present Illness: Fanny Pizano is a 32 y.o. female who is here today to get her work from home paperwork filled out again we need to fill it out for her anxiety IBS and headaches however her work is requesting some more detail       Subjective      Review of Systems:   Review of Systems   Constitutional: Negative.    HENT: Negative.    Eyes: Negative.    Respiratory: Negative.    Cardiovascular: Negative.    Gastrointestinal: Positive for diarrhea and nausea.   Endocrine: Negative.    Genitourinary: Negative.    Musculoskeletal: Negative.    Skin: Negative.    Allergic/Immunologic: Negative.    Neurological: Negative.    Hematological: Negative.    Psychiatric/Behavioral: Positive for sleep disturbance and depressed mood. The patient is nervous/anxious.        I have reviewed and the following portions of the patient's history were updated as appropriate: past family history, past medical history, past social history, past surgical history and problem list.    Medications:     Current Outpatient Medications:   •  cetirizine  (zyrTEC) 10 MG tablet, , Disp: , Rfl:   •  Falmina 0.1-20 MG-MCG per tablet, , Disp: , Rfl:   •  Isywuw-QjUcj-JjAxp-Meth-FA-DHA (Prenate Mini) 18-0.6-0.4-350 MG capsule, , Disp: , Rfl:   •  sertraline (ZOLOFT) 50 MG tablet, , Disp: , Rfl:   •  SUMAtriptan (Imitrex) 50 MG tablet, Take one tablet at onset of headache. May repeat dose one time in 2 hours if headache not relieved., Disp: 10 tablet, Rfl: 0  •  triamcinolone (KENALOG) 0.1 % ointment, Apply 1 application topically to the appropriate area as directed 2 (Two) Times a Day., Disp: 4 g, Rfl: 0    Allergies:   No Known Allergies    Objective     Physical Exam:  Vital Signs: There were no vitals filed for this visit.  There is no height or weight on file to calculate BMI.    Physical Exam  Vitals reviewed: EXAM IS DONE AND LIMITED TO TELEMEDICINE DUE TO COVID.           Assessment / Plan      Assessment/Plan:   Diagnoses and all orders for this visit:    1. KAMALA (generalized anxiety disorder) (Primary)  Paperwork to have her work from home she is stable on her medication  She is on Zoloft she is trying to get pregnant is aware of the risks and side effects of this medication through pregnancy    2. Irritable bowel syndrome with both constipation and diarrhea  Chronic in nature    3. Other headache syndrome  Chronic continue conservative measures    4. Concentration deficit  Chronic in nature    PATIENT HAS BEEN ADVISED WHILE ON NEW MEDICATION PRESCRIBED IT IS IMPORTANT TO USE BACK UP CONTRACEPTION OR BACK UP BIRTH CONTROL AS THE USE OF THIS MEDICATION WITH PREGNANCY COULD CAUSE POTENTIAL RISKS IF PATIENT DOES BECOME PREGNANT.    MEDICATION SIDE EFFECTS AND RISKS HAVE BEEN DISCUSSED WITH PATIENT. PATIENT NOTES UNDERSTANDING. IF ANY CONCERN OR QUESTION REGARDING MEDICATION PLEASE CONTACT.        Follow Up:   No follow-ups on file.      35 minutes were spent reviewing the patient's questionnaire, formulating a treatment plan, and relaying information to the patient  via [employer].    Ailyn Barraza DO  [facility]   12/06/22  10:54 EST

## 2023-02-07 ENCOUNTER — TELEPHONE (OUTPATIENT)
Dept: FAMILY MEDICINE CLINIC | Facility: CLINIC | Age: 33
End: 2023-02-07

## 2023-02-07 NOTE — TELEPHONE ENCOUNTER
Patient rescheduled her appointment due to work.  She wanted to schedule lab appointment to get done prior to her 2/20/2023 appt with Dr. Barraza.  She said she wasn't sure if labs needed to be done. There are no active orders in the patient's chart.  She would like to know if she needs labs. If so, she's requesting that Dr. Barraza enter them into the system and to be contacted once they are entered to schedule a lab appointment. Ph: [phone]

## 2023-02-20 ENCOUNTER — OFFICE VISIT (OUTPATIENT)
Dept: FAMILY MEDICINE CLINIC | Facility: CLINIC | Age: 33
End: 2023-02-20
Payer: COMMERCIAL

## 2023-02-20 ENCOUNTER — TELEPHONE (OUTPATIENT)
Dept: FAMILY MEDICINE CLINIC | Facility: CLINIC | Age: 33
End: 2023-02-20

## 2023-02-20 DIAGNOSIS — E78.2 MIXED HYPERLIPIDEMIA: ICD-10-CM

## 2023-02-20 DIAGNOSIS — F33.1 MODERATE EPISODE OF RECURRENT MAJOR DEPRESSIVE DISORDER: ICD-10-CM

## 2023-02-20 DIAGNOSIS — E28.2 PCOS (POLYCYSTIC OVARIAN SYNDROME): Primary | ICD-10-CM

## 2023-02-20 DIAGNOSIS — K21.9 GASTROESOPHAGEAL REFLUX DISEASE WITHOUT ESOPHAGITIS: ICD-10-CM

## 2023-02-20 PROCEDURE — 36415 COLL VENOUS BLD VENIPUNCTURE: CPT | Performed by: FAMILY MEDICINE

## 2023-02-20 PROCEDURE — 99214 OFFICE O/P EST MOD 30 MIN: CPT | Performed by: FAMILY MEDICINE

## 2023-02-20 RX ORDER — OMEPRAZOLE 20 MG/1
20 CAPSULE, DELAYED RELEASE ORAL DAILY
Qty: 30 CAPSULE | Refills: 0 | Status: SHIPPED | OUTPATIENT
Start: 2023-02-20

## 2023-02-20 RX ORDER — SERTRALINE HYDROCHLORIDE 100 MG/1
100 TABLET, FILM COATED ORAL DAILY
Qty: 30 TABLET | Refills: 4 | Status: SHIPPED | OUTPATIENT
Start: 2023-02-20

## 2023-02-20 NOTE — PROGRESS NOTES
Chief Complaint  Med Refill    Subjective          Fanny Pizano presents to [facility]  History of Present Illness  Patient is a 32-year-old female presents for follow-up examination    We switched her to Zoloft at her last appointment because she is attempting pregnancy.  She still has some depression symptoms no SI HI noted no anxiety.    She is continuing to struggle with attempting to get pregnant they have been trying since August.  She has been pregnant 1 previous times in the past about 3 years ago.  She does believe she has a history of PCOS on her last pregnancy she was put on metformin and tolerated this well and help with her fertility.  She otherwise states that her periods have changed in nature as well and their duration.      Objective   Vital Signs:   There were no vitals taken for this visit.    There is no height or weight on file to calculate BMI.    Review of Systems   Constitutional: Negative.    HENT: Negative.    Eyes: Negative.    Respiratory: Negative.    Cardiovascular: Negative.    Gastrointestinal: Negative.    Endocrine: Negative.    Genitourinary: Negative.    Musculoskeletal: Negative.    Skin: Negative.    Allergic/Immunologic: Negative.    Neurological: Negative.    Hematological: Negative.    Psychiatric/Behavioral: Negative.  Positive for depressed mood.       Past History:  Medical History: has a past medical history of Strabismus and Tobacco dependence syndrome.   Surgical History: has a past surgical history that includes Strabismus surgery.   Family History: family history includes Breast cancer in her paternal grandmother; Coronary artery disease in an other family member; Diabetes in an other family member; Graves' disease in her mother.   Social History: reports that she quit smoking about 5 years ago. Her smoking use included cigarettes. She started smoking about 18 years ago. She has a 13.00 pack-year smoking history. She has never used  smokeless tobacco. She reports that she does not currently use alcohol. She reports that she does not use drugs.      Current Outpatient Medications:   •  cetirizine (zyrTEC) 10 MG tablet, , Disp: , Rfl:   •  sertraline (ZOLOFT) 50 MG tablet, Take 1 tablet by mouth Daily., Disp: 90 tablet, Rfl: 0  •  SUMAtriptan (Imitrex) 50 MG tablet, Take one tablet at onset of headache. May repeat dose one time in 2 hours if headache not relieved., Disp: 10 tablet, Rfl: 0  •  Falmina 0.1-20 MG-MCG per tablet, , Disp: , Rfl:   •  metFORMIN (Glucophage) 500 MG tablet, Take 1 tablet by mouth Daily With Breakfast., Disp: 30 tablet, Rfl: 2  •  omeprazole (priLOSEC) 20 MG capsule, Take 1 capsule by mouth Daily., Disp: 30 capsule, Rfl: 0  •  Wwtfae-ZwStj-TnTus-Meth-FA-DHA (Prenate Mini) 18-0.6-0.4-350 MG capsule, Take 1 each by mouth Daily., Disp: 90 capsule, Rfl: 0  •  sertraline (Zoloft) 100 MG tablet, Take 1 tablet by mouth Daily., Disp: 30 tablet, Rfl: 4  •  triamcinolone (KENALOG) 0.1 % ointment, Apply 1 application topically to the appropriate area as directed 2 (Two) Times a Day., Disp: 4 g, Rfl: 0    Allergies: Patient has no known allergies.    Physical Exam  Constitutional:       Appearance: Normal appearance. She is normal weight.   HENT:      Head: Normocephalic and atraumatic.   Eyes:      Conjunctiva/sclera: Conjunctivae normal.   Cardiovascular:      Rate and Rhythm: Normal rate and regular rhythm.   Pulmonary:      Effort: Pulmonary effort is normal.      Breath sounds: Normal breath sounds.   Musculoskeletal:         General: Normal range of motion.      Cervical back: Normal range of motion and neck supple.   Skin:     General: Skin is warm and dry.   Neurological:      General: No focal deficit present.      Mental Status: She is alert and oriented to person, place, and time. Mental status is at baseline.   Psychiatric:         Mood and Affect: Mood normal.         Behavior: Behavior normal.         Thought Content:  Thought content normal.         Judgment: Judgment normal.          Result Review :                   Assessment and Plan    Diagnoses and all orders for this visit:    1. PCOS (polycystic ovarian syndrome) (Primary)  -     Comprehensive Metabolic Panel; Future  -     CBC Auto Differential; Future  -     TSH; Future  -     Comprehensive Metabolic Panel  -     CBC Auto Differential  -     TSH  We will get some blood work per patient's request she was previously on metformin prior and during her last pregnancy and this really seem to help her get pregnant would like to see if she can reinitiate this medication we will go ahead and start her metformin would like to advise her of the risks of this medication in the first trimester of pregnancy she voices full understanding    2. Mixed hyperlipidemia  -     Lipid Panel; Future  -     Lipid Panel  We will go ahead and repeat lipid panel and she wants to stay off the statin for now she is attempting pregnancy    3. Gastroesophageal reflux disease without esophagitis  She wants to go ahead and do as needed Prilosec have advised that we would like to make sure she is not on this medication if she attempts pregnancy with the risks understandable recommend dietary modification    4.  Moderate episode of recurrent major depressive disorder  We will go ahead and increase her Zoloft to 100 mg from pregnancy safety and efficacy standpoint and recheck in 4 to 6 weeks    Other orders  -     sertraline (Zoloft) 100 MG tablet; Take 1 tablet by mouth Daily.  Dispense: 30 tablet; Refill: 4  -     omeprazole (priLOSEC) 20 MG capsule; Take 1 capsule by mouth Daily.  Dispense: 30 capsule; Refill: 0      PATIENT HAS BEEN ADVISED WHILE ON NEW MEDICATION PRESCRIBED IT IS IMPORTANT TO USE BACK UP CONTRACEPTION OR BACK UP BIRTH CONTROL AS THE USE OF THIS MEDICATION WITH PREGNANCY COULD CAUSE POTENTIAL RISKS IF PATIENT DOES BECOME PREGNANT.    MEDICATION SIDE EFFECTS AND RISKS HAVE BEEN  DISCUSSED WITH PATIENT. PATIENT NOTES UNDERSTANDING. IF ANY CONCERN OR QUESTION REGARDING MEDICATION PLEASE CONTACT.     Follow Up   No follow-ups on file.  Patient was given instructions and counseling regarding her condition or for health maintenance advice. Please see specific information pulled into the AVS if appropriate.     Ailyn Barraza DO

## 2023-02-20 NOTE — TELEPHONE ENCOUNTER
Caller: Fanny Pizano    Relationship: Self    Best call back number: [phone]    What medications are you currently taking:   Current Outpatient Medications on File Prior to Visit   Medication Sig Dispense Refill   • cetirizine (zyrTEC) 10 MG tablet      • Falmina 0.1-20 MG-MCG per tablet      • omeprazole (priLOSEC) 20 MG capsule Take 1 capsule by mouth Daily. 30 capsule 0   • Ercnaj-GgYdj-ZwSln-Meth-FA-DHA (Prenate Mini) 18-0.6-0.4-350 MG capsule Take 1 each by mouth Daily. 90 capsule 0   • sertraline (Zoloft) 100 MG tablet Take 1 tablet by mouth Daily. 30 tablet 4   • sertraline (ZOLOFT) 50 MG tablet Take 1 tablet by mouth Daily. 90 tablet 0   • SUMAtriptan (Imitrex) 50 MG tablet Take one tablet at onset of headache. May repeat dose one time in 2 hours if headache not relieved. 10 tablet 0   • triamcinolone (KENALOG) 0.1 % ointment Apply 1 application topically to the appropriate area as directed 2 (Two) Times a Day. 4 g 0     No current facility-administered medications on file prior to visit.      Which medication are you concerned about: METFORMIN    Who prescribed you this medication: STEVEN    What are your concerns: PATIENT STATED THAT ALL HER MEDICATIONS WERE CALLED IN BUT THIS ONE.

## 2023-02-21 LAB
ALBUMIN SERPL-MCNC: 4.4 G/DL (ref 3.8–4.8)
ALBUMIN/GLOB SERPL: 1.8 {RATIO} (ref 1.2–2.2)
ALP SERPL-CCNC: 76 IU/L (ref 44–121)
ALT SERPL-CCNC: 27 IU/L (ref 0–32)
AST SERPL-CCNC: 21 IU/L (ref 0–40)
BASOPHILS # BLD AUTO: 0.1 X10E3/UL (ref 0–0.2)
BASOPHILS NFR BLD AUTO: 1 %
BILIRUB SERPL-MCNC: 0.3 MG/DL (ref 0–1.2)
BUN SERPL-MCNC: 12 MG/DL (ref 6–20)
BUN/CREAT SERPL: 17 (ref 9–23)
CALCIUM SERPL-MCNC: 8.9 MG/DL (ref 8.7–10.2)
CHLORIDE SERPL-SCNC: 105 MMOL/L (ref 96–106)
CHOLEST SERPL-MCNC: 218 MG/DL (ref 100–199)
CO2 SERPL-SCNC: 22 MMOL/L (ref 20–29)
CREAT SERPL-MCNC: 0.72 MG/DL (ref 0.57–1)
EGFRCR SERPLBLD CKD-EPI 2021: 112 ML/MIN/1.73
EOSINOPHIL # BLD AUTO: 0.1 X10E3/UL (ref 0–0.4)
EOSINOPHIL NFR BLD AUTO: 2 %
ERYTHROCYTE [DISTWIDTH] IN BLOOD BY AUTOMATED COUNT: 12 % (ref 11.7–15.4)
GLOBULIN SER CALC-MCNC: 2.5 G/DL (ref 1.5–4.5)
GLUCOSE SERPL-MCNC: 80 MG/DL (ref 70–99)
HCT VFR BLD AUTO: 43.1 % (ref 34–46.6)
HDLC SERPL-MCNC: 38 MG/DL
HGB BLD-MCNC: 14.2 G/DL (ref 11.1–15.9)
IMM GRANULOCYTES # BLD AUTO: 0 X10E3/UL (ref 0–0.1)
IMM GRANULOCYTES NFR BLD AUTO: 0 %
LDLC SERPL CALC-MCNC: 156 MG/DL (ref 0–99)
LYMPHOCYTES # BLD AUTO: 2.1 X10E3/UL (ref 0.7–3.1)
LYMPHOCYTES NFR BLD AUTO: 33 %
MCH RBC QN AUTO: 31.1 PG (ref 26.6–33)
MCHC RBC AUTO-ENTMCNC: 32.9 G/DL (ref 31.5–35.7)
MCV RBC AUTO: 95 FL (ref 79–97)
MONOCYTES # BLD AUTO: 0.5 X10E3/UL (ref 0.1–0.9)
MONOCYTES NFR BLD AUTO: 7 %
NEUTROPHILS # BLD AUTO: 3.6 X10E3/UL (ref 1.4–7)
NEUTROPHILS NFR BLD AUTO: 57 %
PLATELET # BLD AUTO: 298 X10E3/UL (ref 150–450)
POTASSIUM SERPL-SCNC: 4.5 MMOL/L (ref 3.5–5.2)
PROT SERPL-MCNC: 6.9 G/DL (ref 6–8.5)
RBC # BLD AUTO: 4.56 X10E6/UL (ref 3.77–5.28)
SODIUM SERPL-SCNC: 140 MMOL/L (ref 134–144)
TRIGL SERPL-MCNC: 134 MG/DL (ref 0–149)
TSH SERPL DL<=0.005 MIU/L-ACNC: 1.07 UIU/ML (ref 0.45–4.5)
VLDLC SERPL CALC-MCNC: 24 MG/DL (ref 5–40)
WBC # BLD AUTO: 6.4 X10E3/UL (ref 3.4–10.8)

## 2023-04-27 ENCOUNTER — TELEMEDICINE (OUTPATIENT)
Dept: FAMILY MEDICINE CLINIC | Facility: CLINIC | Age: 33
End: 2023-04-27
Payer: COMMERCIAL

## 2023-04-27 DIAGNOSIS — J30.2 SEASONAL ALLERGIC RHINITIS, UNSPECIFIED TRIGGER: ICD-10-CM

## 2023-04-27 DIAGNOSIS — E78.2 MIXED HYPERLIPIDEMIA: ICD-10-CM

## 2023-04-27 DIAGNOSIS — K21.9 GASTROESOPHAGEAL REFLUX DISEASE WITHOUT ESOPHAGITIS: ICD-10-CM

## 2023-04-27 DIAGNOSIS — G43.709 CHRONIC MIGRAINE WITHOUT AURA WITHOUT STATUS MIGRAINOSUS, NOT INTRACTABLE: ICD-10-CM

## 2023-04-27 DIAGNOSIS — F33.1 MODERATE EPISODE OF RECURRENT MAJOR DEPRESSIVE DISORDER: ICD-10-CM

## 2023-04-27 DIAGNOSIS — E28.2 PCOS (POLYCYSTIC OVARIAN SYNDROME): Primary | ICD-10-CM

## 2023-04-27 DIAGNOSIS — L70.0 CYSTIC ACNE VULGARIS: ICD-10-CM

## 2023-04-27 DIAGNOSIS — B37.31 VAGINAL CANDIDA: ICD-10-CM

## 2023-04-27 PROCEDURE — 99214 OFFICE O/P EST MOD 30 MIN: CPT | Performed by: FAMILY MEDICINE

## 2023-04-27 RX ORDER — CETIRIZINE HYDROCHLORIDE 10 MG/1
10 TABLET ORAL DAILY
Qty: 30 TABLET | Refills: 3 | Status: SHIPPED | OUTPATIENT
Start: 2023-04-27

## 2023-04-27 RX ORDER — SUMATRIPTAN 50 MG/1
TABLET, FILM COATED ORAL
Qty: 10 TABLET | Refills: 0 | Status: SHIPPED | OUTPATIENT
Start: 2023-04-27

## 2023-04-27 RX ORDER — TRETINOIN 0.5 MG/G
1 CREAM TOPICAL NIGHTLY
Qty: 25 G | Refills: 2 | Status: SHIPPED | OUTPATIENT
Start: 2023-04-27

## 2023-04-27 RX ORDER — FLUCONAZOLE 150 MG/1
150 TABLET ORAL DAILY
Qty: 3 TABLET | Refills: 0 | Status: SHIPPED | OUTPATIENT
Start: 2023-04-27

## 2023-04-27 NOTE — PROGRESS NOTES
Telehealth E-Visit      Date: 2023   Patient Name: Fanny Pizano  : 1990   MRN: [id number]     Chief Complaint:    Chief Complaint   Patient presents with   • Follow-up     Refills      Telehealth Visit completed via MY CHART for video conferencing, patient here for telehealth visit. Patient understands the limitations of the visit with telehealth given limitations in the exam findings but patient is agreeable to this telemedicine visit due to concerns with COVID 19 exposure if patient were to be present at the office at this time    Patient location: At home  Provider location: [facility]  Patient has chosen to receive care through telemedicine the patient does give consent to use video audio for medical care today.    I have reviewed the E-Visit questionnaire and the patient's answers, my assessment and plan are listed below.     History of Present Illness: Fanny Pizano is a 32 y.o. female who is here today to follow up.  She is going through an excessively stressful.  At home with her family.  Otherwise she needs refills of her other medications.    Subjective      Review of Systems:   Review of Systems   Constitutional: Negative.    HENT: Negative.    Eyes: Negative.    Respiratory: Negative.    Cardiovascular: Negative.    Gastrointestinal: Negative.    Endocrine: Negative.    Genitourinary: Negative.    Musculoskeletal: Negative.    Skin: Negative.    Allergic/Immunologic: Negative.    Neurological: Negative.    Hematological: Negative.    Psychiatric/Behavioral: Negative.        I have reviewed and the following portions of the patient's history were updated as appropriate: past family history, past medical history, past social history, past surgical history and problem list.    Medications:     Current Outpatient Medications:   •  cetirizine (zyrTEC) 10 MG tablet, Take 1 tablet by mouth Daily., Disp: 30 tablet, Rfl: 3  •  Falmina 0.1-20 MG-MCG per  tablet, , Disp: , Rfl:   •  metFORMIN (Glucophage) 500 MG tablet, Take 1 tablet by mouth Daily With Breakfast., Disp: 90 tablet, Rfl: 2  •  omeprazole (priLOSEC) 20 MG capsule, Take 1 capsule by mouth Daily., Disp: 30 capsule, Rfl: 0  •  Cujtch-RrFyy-AmPha-Meth-FA-DHA (Prenate Mini) 18-0.6-0.4-350 MG capsule, Take 1 each by mouth Daily., Disp: 90 capsule, Rfl: 0  •  sertraline (Zoloft) 100 MG tablet, Take 1 tablet by mouth Daily., Disp: 30 tablet, Rfl: 4  •  sertraline (ZOLOFT) 50 MG tablet, Take 1 tablet by mouth Daily., Disp: 90 tablet, Rfl: 3  •  SUMAtriptan (Imitrex) 50 MG tablet, Take one tablet at onset of headache. May repeat dose one time in 2 hours if headache not relieved., Disp: 10 tablet, Rfl: 0  •  triamcinolone (KENALOG) 0.1 % ointment, Apply 1 application topically to the appropriate area as directed 2 (Two) Times a Day., Disp: 4 g, Rfl: 0  •  fluconazole (Diflucan) 150 MG tablet, Take 1 tablet by mouth Daily., Disp: 3 tablet, Rfl: 0  •  tretinoin (RETIN-A) 0.05 % cream, Apply 1 application topically to the appropriate area as directed Every Night., Disp: 25 g, Rfl: 2    Allergies:   No Known Allergies    Objective     Physical Exam:  Vital Signs: There were no vitals filed for this visit.  There is no height or weight on file to calculate BMI.    Physical Exam  Vitals reviewed: EXAM IS DONE AND LIMITED TO TELEMEDICIN DUE TO COVID.             Assessment / Plan      Assessment/Plan:   Diagnoses and all orders for this visit:    1. PCOS (polycystic ovarian syndrome) (Primary)  Stable continue metformin    2. Mixed hyperlipidemia  Monitor    3. Gastroesophageal reflux disease without esophagitis  Stable on meds    4. Seasonal allergic rhinitis, unspecified trigger  Stable on meds    5. Cystic acne vulgaris  We will send in topical tretinoin and avoid the eyes    6. Moderate episode of recurrent major depressive disorder  Continue Zoloft    7. Chronic migraine without aura without status migrainosus, not  intractable  As needed Imitrex    8. Vaginal candida  She believes she may have a vaginal line infection with yeast we will go ahead and call in Diflucan if symptoms persist return for evaluation    Other orders  -     tretinoin (RETIN-A) 0.05 % cream; Apply 1 application topically to the appropriate area as directed Every Night.  Dispense: 25 g; Refill: 2  -     metFORMIN (Glucophage) 500 MG tablet; Take 1 tablet by mouth Daily With Breakfast.  Dispense: 90 tablet; Refill: 2  -     sertraline (ZOLOFT) 50 MG tablet; Take 1 tablet by mouth Daily.  Dispense: 90 tablet; Refill: 3  -     SUMAtriptan (Imitrex) 50 MG tablet; Take one tablet at onset of headache. May repeat dose one time in 2 hours if headache not relieved.  Dispense: 10 tablet; Refill: 0  -     fluconazole (Diflucan) 150 MG tablet; Take 1 tablet by mouth Daily.  Dispense: 3 tablet; Refill: 0  -     cetirizine (zyrTEC) 10 MG tablet; Take 1 tablet by mouth Daily.  Dispense: 30 tablet; Refill: 3         PATIENT HAS BEEN ADVISED WHILE ON NEW MEDICATION PRESCRIBED IT IS IMPORTANT TO USE BACK UP CONTRACEPTION OR BACK UP BIRTH CONTROL AS THE USE OF THIS MEDICATION WITH PREGNANCY COULD CAUSE POTENTIAL RISKS IF PATIENT DOES BECOME PREGNANT.    MEDICATION SIDE EFFECTS AND RISKS HAVE BEEN DISCUSSED WITH PATIENT. PATIENT NOTES UNDERSTANDING. IF ANY CONCERN OR QUESTION REGARDING MEDICATION PLEASE CONTACT.       Follow Up:   No follow-ups on file.    PATIENT HAS BEEN ADVISED WHILE ON NEW MEDICATION PRESCRIBED IT IS IMPORTANT TO USE BACK UP CONTRACEPTION OR BACK UP BIRTH CONTROL AS THE USE OF THIS MEDICATION WITH PREGNANCY COULD CAUSE POTENTIAL RISKS IF PATIENT DOES BECOME PREGNANT.    MEDICATION SIDE EFFECTS AND RISKS HAVE BEEN DISCUSSED WITH PATIENT. PATIENT NOTES UNDERSTANDING. IF ANY CONCERN OR QUESTION REGARDING MEDICATION PLEASE CONTACT.     45 minutes were spent reviewing the patient's questionnaire, formulating a treatment plan, and relaying information to the  patient via MyChart.    Ailyn Barraza DO  [facility]   04/28/23  14:58 EDT

## 2023-05-12 ENCOUNTER — OFFICE VISIT (OUTPATIENT)
Dept: FAMILY MEDICINE CLINIC | Facility: CLINIC | Age: 33
End: 2023-05-12
Payer: COMMERCIAL

## 2023-05-12 VITALS
BODY MASS INDEX: 34.78 KG/M2 | WEIGHT: 221.6 LBS | TEMPERATURE: 98 F | HEART RATE: 81 BPM | DIASTOLIC BLOOD PRESSURE: 80 MMHG | OXYGEN SATURATION: 97 % | HEIGHT: 67 IN | SYSTOLIC BLOOD PRESSURE: 132 MMHG | RESPIRATION RATE: 15 BRPM

## 2023-05-12 DIAGNOSIS — E28.2 PCOS (POLYCYSTIC OVARIAN SYNDROME): ICD-10-CM

## 2023-05-12 DIAGNOSIS — L70.9 ACNE, UNSPECIFIED ACNE TYPE: ICD-10-CM

## 2023-05-12 DIAGNOSIS — Z87.891 PERSONAL HISTORY OF TOBACCO USE: ICD-10-CM

## 2023-05-12 DIAGNOSIS — R21 RASH AND NONSPECIFIC SKIN ERUPTION: Primary | ICD-10-CM

## 2023-05-12 PROCEDURE — 99214 OFFICE O/P EST MOD 30 MIN: CPT | Performed by: PHYSICIAN ASSISTANT

## 2023-05-12 NOTE — ASSESSMENT & PLAN NOTE
Looks like some type of tinea infection may be a spot of tinea versicolor but would be unusual for further just to be 1 spot.  Could be tinea cruras.  I recommend using Lamisil twice daily over-the-counter for at least 4 weeks.  If not resolved have dermatology evaluate.

## 2023-05-12 NOTE — PROGRESS NOTES
Patient Office Visit      Patient Name: Fanny Pizano  : 1990   MRN: [id number]     Chief Complaint:    Chief Complaint   Patient presents with   • Acne   • Rash       History of Present Illness: Fanny Pizano is a 32 y.o. female who is here today complaining of acne.  She is on metformin but not taking consistently because she cannot remember to take in the morning.  Her prescription says to take in the morning but she is wondering how important that is.  She is trying to get pregnant and is on metformin for probable PCOS.  She is asking about spironolactone for acne.  She has a prescription for tretinoin topical but has not started using.  She does have an appointment with dermatology in July.  She has a 3-year-old that has had to go into .  Her parents previously took care of her 3-year-old but her dad was diagnosed recently with stage IV stomach cancer.  She has a 16-year-old stepson.  She said she just has a lot going on.  She does have a rash on the right anterior thigh that has been there for about 3 months.  She really does not think it is a ringworm because it does not itch.    Subjective      Review of Systems:   Review of Systems   Constitutional: Negative for fatigue and fever.   HENT: Negative for congestion, rhinorrhea and sore throat.    Eyes: Negative for pain.   Respiratory: Negative for cough and shortness of breath.    Gastrointestinal: Negative for diarrhea and vomiting.   Skin: Positive for rash.        Past Medical History:   Past Medical History:   Diagnosis Date   • Strabismus    • Tobacco dependence syndrome        Past Surgical History:   Past Surgical History:   Procedure Laterality Date   • STRABISMUS SURGERY         Family History:   Family History   Problem Relation Age of Onset   • Graves' disease Mother    • Breast cancer Paternal Grandmother    • Coronary artery disease Other    • Diabetes Other        Social History:   Social History     Socioeconomic  "History   • Marital status:    Tobacco Use   • Smoking status: Former     Packs/day: 1.00     Years: 13.00     Pack years: 13.00     Types: Cigarettes     Start date:      Quit date: 2018     Years since quittin.3   • Smokeless tobacco: Never   Vaping Use   • Vaping Use: Never used   Substance and Sexual Activity   • Alcohol use: Not Currently   • Drug use: Never   • Sexual activity: Yes     Partners: Male       Allergies:   No Known Allergies    Objective     Physical Exam:  Vital Signs:   Vitals:    23 1507   BP: 132/80   BP Location: Right arm   Patient Position: Sitting   Cuff Size: Adult   Pulse: 81   Resp: 15   Temp: 98 °F (36.7 °C)   TempSrc: Temporal   SpO2: 97%   Weight: 101 kg (221 lb 9.6 oz)   Height: 170.2 cm (67\")     Body mass index is 34.71 kg/m².        Physical Exam  Constitutional:       General: She is not in acute distress.     Appearance: Normal appearance. She is overweight.   Skin:     Comments: Mild facial acne.  She said sometimes she gets the deep nodules.  Right anterior lateral thigh with a 4 to 5 cm tan slightly scaling patch with a bit of a central clearing.   Neurological:      Mental Status: She is alert.   Psychiatric:         Mood and Affect: Mood normal.         Behavior: Behavior normal.         Thought Content: Thought content normal.         Judgment: Judgment normal.         Procedures    Assessment / Plan      Assessment/Plan:   Diagnoses and all orders for this visit:    1. Rash and nonspecific skin eruption (Primary)  Assessment & Plan:  Looks like some type of tinea infection may be a spot of tinea versicolor but would be unusual for further just to be 1 spot.  Could be tinea cruras.  I recommend using Lamisil twice daily over-the-counter for at least 4 weeks.  If not resolved have dermatology evaluate.      2. Acne, unspecified acne type  Assessment & Plan:  I recommend using Oxy wash twice a day and get the topical tretinoin started but if she were to " become pregnant she would have to discontinue the tretinoin.  Keep upcoming appointment with dermatology.      3. PCOS (polycystic ovarian syndrome)  Assessment & Plan:  Continue metformin.  Is okay to take this with dinner instead of breakfast.  She is trying to get pregnant.  She probably does have an excess androgen state due to her PCOS which could be treated with spironolactone in the future but not while she is trying to conceive.      4. Personal history of tobacco use  Assessment & Plan:  Former smoker.           Medications:     Current Outpatient Medications:   •  cetirizine (zyrTEC) 10 MG tablet, Take 1 tablet by mouth Daily., Disp: 30 tablet, Rfl: 3  •  Falmina 0.1-20 MG-MCG per tablet, , Disp: , Rfl:   •  fluconazole (Diflucan) 150 MG tablet, Take 1 tablet by mouth Daily., Disp: 3 tablet, Rfl: 0  •  metFORMIN (Glucophage) 500 MG tablet, Take 1 tablet by mouth Daily With Breakfast., Disp: 90 tablet, Rfl: 2  •  omeprazole (priLOSEC) 20 MG capsule, Take 1 capsule by mouth Daily., Disp: 30 capsule, Rfl: 0  •  Rzfzgb-HnNuv-OzAza-Meth-FA-DHA (Prenate Mini) 18-0.6-0.4-350 MG capsule, Take 1 each by mouth Daily., Disp: 90 capsule, Rfl: 0  •  sertraline (Zoloft) 100 MG tablet, Take 1 tablet by mouth Daily., Disp: 30 tablet, Rfl: 4  •  sertraline (ZOLOFT) 50 MG tablet, Take 1 tablet by mouth Daily., Disp: 90 tablet, Rfl: 3  •  SUMAtriptan (Imitrex) 50 MG tablet, Take one tablet at onset of headache. May repeat dose one time in 2 hours if headache not relieved., Disp: 10 tablet, Rfl: 0  •  tretinoin (RETIN-A) 0.05 % cream, Apply 1 application topically to the appropriate area as directed Every Night., Disp: 25 g, Rfl: 2  •  triamcinolone (KENALOG) 0.1 % ointment, Apply 1 application topically to the appropriate area as directed 2 (Two) Times a Day., Disp: 4 g, Rfl: 0    I spent 30 minutes caring for Fanny on this date of service. This time includes time spent by me in the following activities:preparing for the  visit, obtaining and/or reviewing a separately obtained history, performing a medically appropriate examination and/or evaluation , counseling and educating the patient/family/caregiver, referring and communicating with other health care professionals  and documenting information in the medical record    Follow Up:   No follow-ups on file.    Viktoria Dunne PA-C   [facility]   Answers for HPI/ROS submitted by the patient on 5/9/2023  What is the primary reason for your visit?: Rash  Chronicity: new  Onset: 1 to 4 weeks ago  Progression since onset: unchanged  Affected locations: right upper leg  Characteristics: redness, bruising  Exposed to: nothing, a new medication  anorexia: No  facial edema: No  joint pain: No  nail changes: No

## 2023-05-12 NOTE — ASSESSMENT & PLAN NOTE
I recommend using Oxy wash twice a day and get the topical tretinoin started but if she were to become pregnant she would have to discontinue the tretinoin.  Keep upcoming appointment with dermatology.

## 2023-05-12 NOTE — ASSESSMENT & PLAN NOTE
Continue metformin.  Is okay to take this with dinner instead of breakfast.  She is trying to get pregnant.  She probably does have an excess androgen state due to her PCOS which could be treated with spironolactone in the future but not while she is trying to conceive.

## 2023-10-25 ENCOUNTER — TELEPHONE (OUTPATIENT)
Dept: FAMILY MEDICINE CLINIC | Facility: CLINIC | Age: 33
End: 2023-10-25

## 2023-10-25 NOTE — TELEPHONE ENCOUNTER
Caller: Fanny Pizano    Relationship: Self    Best call back number: [phone]     What orders are you requesting (i.e. lab or imaging): LABS FOR PHYSICAL IN FEB    In what timeframe would the patient need to come in: WEEK BEFORE THE 2.26.24 APPOINTMENT IN MORNING     Where will you receive your lab/imaging services: OFFICE     Additional notes: PLEASE GET LAB ORDERS IN AND CALL PATIENT TO SCHEDULE LABS FOR 1 WEEK BEFORE HER 2.26 APPOINTMENT.

## 2023-10-26 DIAGNOSIS — Z00.00 ENCOUNTER FOR WELLNESS EXAMINATION IN ADULT: Primary | ICD-10-CM

## 2024-04-24 ENCOUNTER — HOSPITAL ENCOUNTER (INPATIENT)
Facility: HOSPITAL | Age: 34
LOS: 1 days | Discharge: HOME | End: 2024-04-25
Attending: INTERNAL MEDICINE | Admitting: INTERNAL MEDICINE
Payer: COMMERCIAL

## 2024-04-24 PROBLEM — N12 PYELONEPHRITIS: Status: ACTIVE | Noted: 2024-04-24

## 2024-04-24 LAB
ALBUMIN SERPL-MCNC: 3.4 G/DL (ref 3.5–5.2)
ALBUMIN/GLOB SERPL: 1.4 G/DL
ALP SERPL-CCNC: 47 U/L (ref 39–117)
ALT SERPL W P-5'-P-CCNC: 12 U/L (ref 1–33)
ANION GAP SERPL CALCULATED.3IONS-SCNC: 11 MMOL/L (ref 5–15)
AST SERPL-CCNC: 18 U/L (ref 1–32)
BASOPHILS # BLD AUTO: 0.02 10*3/MM3 (ref 0–0.2)
BASOPHILS NFR BLD AUTO: 0.1 % (ref 0–1.5)
BILIRUB SERPL-MCNC: 0.2 MG/DL (ref 0–1.2)
BUN SERPL-MCNC: 7 MG/DL (ref 6–20)
BUN/CREAT SERPL: 12.5 (ref 7–25)
CALCIUM SPEC-SCNC: 8.4 MG/DL (ref 8.6–10.5)
CHLORIDE SERPL-SCNC: 106 MMOL/L (ref 98–107)
CO2 SERPL-SCNC: 21 MMOL/L (ref 22–29)
CREAT SERPL-MCNC: 0.56 MG/DL (ref 0.57–1)
CRP SERPL-MCNC: 0.82 MG/DL (ref 0–0.5)
DEPRECATED RDW RBC AUTO: 40 FL (ref 37–54)
EGFRCR SERPLBLD CKD-EPI 2021: 122.2 ML/MIN/1.73
EOSINOPHIL # BLD AUTO: 0.06 10*3/MM3 (ref 0–0.4)
EOSINOPHIL NFR BLD AUTO: 0.4 % (ref 0.3–6.2)
ERYTHROCYTE [DISTWIDTH] IN BLOOD BY AUTOMATED COUNT: 12.3 % (ref 12.3–15.4)
GLOBULIN UR ELPH-MCNC: 2.4 GM/DL
GLUCOSE SERPL-MCNC: 92 MG/DL (ref 65–99)
HCT VFR BLD AUTO: 30.5 % (ref 34–46.6)
HGB BLD-MCNC: 10.7 G/DL (ref 12–15.9)
IMM GRANULOCYTES # BLD AUTO: 0.04 10*3/MM3 (ref 0–0.05)
IMM GRANULOCYTES NFR BLD AUTO: 0.3 % (ref 0–0.5)
LYMPHOCYTES # BLD AUTO: 2.48 10*3/MM3 (ref 0.7–3.1)
LYMPHOCYTES NFR BLD AUTO: 17.7 % (ref 19.6–45.3)
MCH RBC QN AUTO: 31.5 PG (ref 26.6–33)
MCHC RBC AUTO-ENTMCNC: 35.1 G/DL (ref 31.5–35.7)
MCV RBC AUTO: 89.7 FL (ref 79–97)
MONOCYTES # BLD AUTO: 0.85 10*3/MM3 (ref 0.1–0.9)
MONOCYTES NFR BLD AUTO: 6.1 % (ref 5–12)
NEUTROPHILS NFR BLD AUTO: 10.58 10*3/MM3 (ref 1.7–7)
NEUTROPHILS NFR BLD AUTO: 75.4 % (ref 42.7–76)
NRBC BLD AUTO-RTO: 0 /100 WBC (ref 0–0.2)
PLATELET # BLD AUTO: 219 10*3/MM3 (ref 140–450)
PMV BLD AUTO: 9.5 FL (ref 6–12)
POTASSIUM SERPL-SCNC: 3.8 MMOL/L (ref 3.5–5.2)
PROCALCITONIN SERPL-MCNC: 0.08 NG/ML (ref 0–0.25)
PROT SERPL-MCNC: 5.8 G/DL (ref 6–8.5)
RBC # BLD AUTO: 3.4 10*6/MM3 (ref 3.77–5.28)
SODIUM SERPL-SCNC: 138 MMOL/L (ref 136–145)
WBC NRBC COR # BLD AUTO: 14.03 10*3/MM3 (ref 3.4–10.8)

## 2024-04-24 PROCEDURE — 99222 1ST HOSP IP/OBS MODERATE 55: CPT | Performed by: INTERNAL MEDICINE

## 2024-04-24 PROCEDURE — 85025 COMPLETE CBC W/AUTO DIFF WBC: CPT | Performed by: INTERNAL MEDICINE

## 2024-04-24 PROCEDURE — 86140 C-REACTIVE PROTEIN: CPT | Performed by: INTERNAL MEDICINE

## 2024-04-24 PROCEDURE — 99221 1ST HOSP IP/OBS SF/LOW 40: CPT | Performed by: NURSE PRACTITIONER

## 2024-04-24 PROCEDURE — 80053 COMPREHEN METABOLIC PANEL: CPT | Performed by: INTERNAL MEDICINE

## 2024-04-24 PROCEDURE — 25010000002 ENOXAPARIN PER 10 MG: Performed by: INTERNAL MEDICINE

## 2024-04-24 PROCEDURE — 25010000002 CEFTRIAXONE PER 250 MG: Performed by: INTERNAL MEDICINE

## 2024-04-24 PROCEDURE — 84145 PROCALCITONIN (PCT): CPT | Performed by: INTERNAL MEDICINE

## 2024-04-24 PROCEDURE — 25810000003 SODIUM CHLORIDE 0.9 % SOLUTION: Performed by: INTERNAL MEDICINE

## 2024-04-24 RX ORDER — SODIUM CHLORIDE 0.9 % (FLUSH) 0.9 %
10 SYRINGE (ML) INJECTION AS NEEDED
Status: DISCONTINUED | OUTPATIENT
Start: 2024-04-24 | End: 2024-04-25 | Stop reason: HOSPADM

## 2024-04-24 RX ORDER — POLYETHYLENE GLYCOL 3350 17 G/17G
17 POWDER, FOR SOLUTION ORAL DAILY PRN
Status: DISCONTINUED | OUTPATIENT
Start: 2024-04-24 | End: 2024-04-25 | Stop reason: HOSPADM

## 2024-04-24 RX ORDER — ENOXAPARIN SODIUM 100 MG/ML
40 INJECTION SUBCUTANEOUS DAILY
Status: DISCONTINUED | OUTPATIENT
Start: 2024-04-24 | End: 2024-04-25 | Stop reason: HOSPADM

## 2024-04-24 RX ORDER — BISACODYL 5 MG/1
5 TABLET, DELAYED RELEASE ORAL DAILY PRN
Status: DISCONTINUED | OUTPATIENT
Start: 2024-04-24 | End: 2024-04-25 | Stop reason: HOSPADM

## 2024-04-24 RX ORDER — MECOBALAMIN 5000 MCG
15 TABLET,DISINTEGRATING ORAL DAILY
COMMUNITY

## 2024-04-24 RX ORDER — CETIRIZINE HYDROCHLORIDE 10 MG/1
10 TABLET ORAL DAILY
Status: DISCONTINUED | OUTPATIENT
Start: 2024-04-24 | End: 2024-04-25 | Stop reason: HOSPADM

## 2024-04-24 RX ORDER — SODIUM CHLORIDE 9 MG/ML
125 INJECTION, SOLUTION INTRAVENOUS CONTINUOUS
Status: DISCONTINUED | OUTPATIENT
Start: 2024-04-24 | End: 2024-04-25 | Stop reason: HOSPADM

## 2024-04-24 RX ORDER — SODIUM CHLORIDE 0.9 % (FLUSH) 0.9 %
10 SYRINGE (ML) INJECTION EVERY 12 HOURS SCHEDULED
Status: DISCONTINUED | OUTPATIENT
Start: 2024-04-24 | End: 2024-04-25 | Stop reason: HOSPADM

## 2024-04-24 RX ORDER — SODIUM CHLORIDE 9 MG/ML
40 INJECTION, SOLUTION INTRAVENOUS AS NEEDED
Status: DISCONTINUED | OUTPATIENT
Start: 2024-04-24 | End: 2024-04-25 | Stop reason: HOSPADM

## 2024-04-24 RX ORDER — BISACODYL 10 MG
10 SUPPOSITORY, RECTAL RECTAL DAILY PRN
Status: DISCONTINUED | OUTPATIENT
Start: 2024-04-24 | End: 2024-04-25 | Stop reason: HOSPADM

## 2024-04-24 RX ORDER — ASPIRIN 81 MG/1
81 TABLET, CHEWABLE ORAL DAILY
Status: DISCONTINUED | OUTPATIENT
Start: 2024-04-24 | End: 2024-04-25 | Stop reason: HOSPADM

## 2024-04-24 RX ORDER — ONDANSETRON 2 MG/ML
4 INJECTION INTRAMUSCULAR; INTRAVENOUS EVERY 6 HOURS PRN
Status: DISCONTINUED | OUTPATIENT
Start: 2024-04-24 | End: 2024-04-25 | Stop reason: HOSPADM

## 2024-04-24 RX ORDER — HYDROMORPHONE HYDROCHLORIDE 1 MG/ML
0.5 INJECTION, SOLUTION INTRAMUSCULAR; INTRAVENOUS; SUBCUTANEOUS
Status: DISCONTINUED | OUTPATIENT
Start: 2024-04-24 | End: 2024-04-25 | Stop reason: HOSPADM

## 2024-04-24 RX ORDER — ASPIRIN 81 MG/1
81 TABLET, CHEWABLE ORAL DAILY
COMMUNITY

## 2024-04-24 RX ORDER — AMOXICILLIN 250 MG
2 CAPSULE ORAL 2 TIMES DAILY PRN
Status: DISCONTINUED | OUTPATIENT
Start: 2024-04-24 | End: 2024-04-25 | Stop reason: HOSPADM

## 2024-04-24 RX ADMIN — ASPIRIN 81 MG CHEWABLE TABLET 81 MG: 81 TABLET CHEWABLE at 10:16

## 2024-04-24 RX ADMIN — SODIUM CHLORIDE 125 ML/HR: 9 INJECTION, SOLUTION INTRAVENOUS at 11:49

## 2024-04-24 RX ADMIN — CETIRIZINE HYDROCHLORIDE 10 MG: 10 TABLET, FILM COATED ORAL at 10:16

## 2024-04-24 RX ADMIN — ENOXAPARIN SODIUM 40 MG: 100 INJECTION SUBCUTANEOUS at 10:16

## 2024-04-24 RX ADMIN — Medication 10 ML: at 20:15

## 2024-04-24 RX ADMIN — CEFTRIAXONE 2000 MG: 2 INJECTION, POWDER, FOR SOLUTION INTRAMUSCULAR; INTRAVENOUS at 10:15

## 2024-04-24 RX ADMIN — SODIUM CHLORIDE 125 ML/HR: 9 INJECTION, SOLUTION INTRAVENOUS at 20:15

## 2024-04-24 NOTE — PLAN OF CARE
Goal Outcome Evaluation:              Outcome Evaluation: APOORVA FORTE. Patient rating flank pain 1/10. Transfer from [facility] this morning. MD paged.

## 2024-04-24 NOTE — H&P
[facility] Medicine Services  HISTORY AND PHYSICAL    Patient Name: Fanny Pizano  : 1990  MRN: [id number]  Primary Care Physician: Ailyn Barraza DO  Date of admission: 2024      Subjective   Subjective     Chief Complaint:  Transfer from outside hospital for right flank pain    HPI:  Fanny Pizano is a 33 y.o. female with no significant past medical history, who is currently 15 weeks pregnant presented to the hospital as a transfer from [facility] for right flank pain and hydronephrosis.    Patient started experiencing intermittent right colicky pain in her right flank region few days prior to visiting [facility].  She saw her OB/GYN who felt that her symptoms might be related to nephrolithiasis.  She started experiencing worsening pain 10/10, colicky in nature, radiating to right groin in the afternoon of 2024 prompting her to go to [facility] for further evaluation.  Renal ultrasound revealed right hydronephrosis.  She was given Rocephin. Urologist at [facility] was not comfortable managing her and patient was transferred to this facility for higher level of care.    At the time of the encounter, patient is comfortable,  With no significant pain, 1/10 in intensity.  No fever or chills.      Personal History     Past Medical History:   Diagnosis Date    Strabismus     Tobacco dependence syndrome            Past Surgical History:   Procedure Laterality Date    STRABISMUS SURGERY         Family History: family history includes Breast cancer in her paternal grandmother; Coronary artery disease in an other family member; Diabetes in an other family member; Graves' disease in her mother.     Social History:  reports that she quit smoking about 6 years ago. Her smoking use included cigarettes. She started smoking about 19 years ago. She has a 13 pack-year smoking history. She has never used smokeless tobacco. She reports that she does  not currently use alcohol. She reports that she does not use drugs.  Social History     Social History Narrative    Not on file       Medications:  Available home medication information reviewed.  Prenate Mini, SUMAtriptan, aspirin, cetirizine, fluconazole, lansoprazole, levonorgestrel-ethinyl estradiol, metFORMIN, omeprazole, sertraline, tretinoin, and triamcinolone    No Known Allergies    Objective   Objective     Vital Signs:   Temp:  [97.9 °F (36.6 °C)-98 °F (36.7 °C)] 97.9 °F (36.6 °C)  Heart Rate:  [82-92] 82  Resp:  [18] 18  BP: (110-114)/(76-82) 110/82       Physical Exam   General: Comfortable, not in distress, conversant and cooperative  Head: Atraumatic and normocephalic  Eyes: No Icterus. No pallor  Ears:  Ears appear intact with no abnormalities noted  Throat: No oral lesions, no thrush  Neck: Supple, trachea midline  Lungs: Clear to auscultation bilaterally, equal air entry, no wheezing or crackles  Heart:  Normal S1 and S2, no murmur, no gallop, No JVD, no lower extremity swelling  Abdomen:  Soft, no tenderness, no organomegaly, normal bowel sounds, no organomegaly  Extremities: pulses equal bilaterally  Skin: No bleeding, bruising or rash, normal skin turgor and elasticity  Neurologic: Cranial nerves appear intact with no evidence of facial asymmetry, normal motor and sensory functions in all 4 extremities  Psych: Alert and oriented x 3, normal mood    Result Review:  I have personally reviewed the results from the time of this admission to 4/24/2024 09:33 EDT and agree with these findings:  [x]  Laboratory list / accordion  []  Microbiology  [x]  Radiology  []  EKG/Telemetry   [x]  Cardiology/Vascular   [x]  Pathology  [x]  Old records      LAB RESULTS:      Lab 04/24/24  0905   WBC 14.03*   HEMOGLOBIN 10.7*   HEMATOCRIT 30.5*   PLATELETS 219   NEUTROS ABS 10.58*   IMMATURE GRANS (ABS) 0.04   LYMPHS ABS 2.48   MONOS ABS 0.85   EOS ABS 0.06   MCV 89.7                                 Microbiology  Results (last 10 days)       ** No results found for the last 240 hours. **            No radiology results from the last 24 hrs        Assessment & Plan   Assessment & Plan       Pyelonephritis      Summary:  33 years old female who is 15 weeks pregnant presented to the hospital with right flank pain.  Ultrasound at [facility] with evidence right hydronephrosis    Right hydronephrosis, physiologic with pregnancy versus pathologic secondary to obstructive uropathy  Urinary tract infection with presumed right pyelonephritis, POA  Continue IV Rocephin  Follow urine cultures from this hospitalization and from outside facility  Discussed with Dr. Ge/urology.  No point of repeating her ultrasound.  Her right hydronephrosis could be physiologic in association with her pregnancy versus pathologic secondary to obstructive uropathy or nephrolithiasis.  Nevertheless, since the patient is hemodynamically stable with no fever or sepsis and her pain under control, she will not require any intervention at the time being given her pregnancy.  In case she declines, she will need OB/GYN evaluation to weigh risk and benefit of intervention.  That plan was discussed with the patient and she is agreeable to conservative therapy with IV fluids, IV antibiotics and supportive measures      DVT prophylaxis:  Medical DVT prophylaxis orders are present.          CODE STATUS:    Code Status and Medical Interventions:   Ordered at: 04/24/24 0931     Level Of Support Discussed With:    Patient     Code Status (Patient has no pulse and is not breathing):    CPR (Attempt to Resuscitate)     Medical Interventions (Patient has pulse or is breathing):    Full Support       Expected Discharge   Expected Discharge Date: 4/26/2024; Expected Discharge Time:      Sen Headley MD  04/24/24

## 2024-04-24 NOTE — PLAN OF CARE
Goal Outcome Evaluation:                                            Patient has no complaints of pain today, getting continuous fluids and renal US pending, VSS.

## 2024-04-24 NOTE — CASE MANAGEMENT/SOCIAL WORK
Discharge Planning Assessment  [facility]     Patient Name: Fanny Pizano  MRN: [id number]  Today's Date: 4/24/2024    Admit Date: 4/24/2024    Plan: Home   Discharge Needs Assessment       Row Name 04/24/24 1024       Living Environment    People in Home spouse;child(jeny), dependent    Current Living Arrangements home    Potentially Unsafe Housing Conditions none    Primary Care Provided by self       Resource/Environmental Concerns    Resource/Environmental Concerns none       Transition Planning    Patient/Family Anticipates Transition to home with family    Patient/Family Anticipated Services at Transition none       Discharge Needs Assessment    Equipment Currently Used at Home none    Equipment Needed After Discharge none      Row Name 04/24/24 1019       Living Environment    People in Home alone    Current Living Arrangements home    Potentially Unsafe Housing Conditions none    Primary Care Provided by self    Family Caregiver if Needed none       Resource/Environmental Concerns    Resource/Environmental Concerns none       Transition Planning    Patient/Family Anticipates Transition to home with family    Patient/Family Anticipated Services at Transition none    Transportation Anticipated family or friend will provide       Discharge Needs Assessment    Equipment Currently Used at Home none    Equipment Needed After Discharge none                   Discharge Plan       Row Name 04/24/24 1019       Plan    Plan Home    Patient/Family in Agreement with Plan yes    Plan Comments Spoke with patient at bedside. Patient lives with  and two dependent children in [place]. She is independent of ADLs. She is not current with home health services. She is doesn't use any DME. PCP is Zaida Back. Insurance is [employer]. Patient discharge plan is home with  to transport. Patient doesn't have any discharge needs at this time. CM will follow.    Final Discharge Disposition Code 01 - home  or self-care                  Continued Care and Services - Admitted Since 4/24/2024    No active coordination exists for this encounter.       Expected Discharge Date and Time       Expected Discharge Date Expected Discharge Time    Apr 26, 2024            Demographic Summary       Row Name 04/24/24 1018       General Information    Admission Type --    Preferred Language English                   Functional Status       Row Name 04/24/24 1024       Functional Status    Usual Activity Tolerance good    Current Activity Tolerance good       Functional Status, IADL    Medications independent    Meal Preparation independent    Housekeeping independent    Laundry independent    Shopping independent      Row Name 04/24/24 1018       Functional Status    Usual Activity Tolerance moderate    Current Activity Tolerance moderate       Functional Status, IADL    Medications independent    Meal Preparation independent    Housekeeping independent    Laundry independent    Shopping independent                   Psychosocial    No documentation.                  Abuse/Neglect    No documentation.                  Legal    No documentation.                  Substance Abuse    No documentation.                  Patient Forms    No documentation.                     Leena Cruz RN

## 2024-04-24 NOTE — CONSULTS
[facility]   HISTORY AND PHYSICAL    Patient Name: Fanny Pizano  : 1990  MRN: [id number]  Primary Care Physician:  Ailyn Barraza DO  Date of admission: 2024    Subjective   Subjective     Chief Complaint: Right flank pain    HPI:    Fanny Pizano is a 33 y.o. female who is 15 weeks pregnant and presented to [facility] from [facility] for right flank pain. She reports her pain began on  with RLQ abdominal pain that radiated to the right flank. Her pain improved over the weekend and then returned Monday and Tuesday. Her pain became uncontrolled at home and she presented to [facility] for further evaluation. She underwent Renal ultrasound at that time which revealed right hydronephrosis. She was transferred to [facility] for Urologic evaluation.     She is currently resting in bed, she denies any pain. She denies dysuria or gross hematuria. She denies history of kidney stones.     Labs reviewed; Cr 0.56, WBC 14.03. She is afebrile.     Review of Systems   All systems were reviewed and negative except for: patient pregnant    Personal History     Past Medical History:   Diagnosis Date    Strabismus     Tobacco dependence syndrome        Past Surgical History:   Procedure Laterality Date    STRABISMUS SURGERY         Family History: family history includes Breast cancer in her paternal grandmother; Coronary artery disease in an other family member; Diabetes in an other family member; Graves' disease in her mother. Otherwise pertinent FHx was reviewed and not pertinent to current issue.    Social History:  reports that she quit smoking about 6 years ago. Her smoking use included cigarettes. She started smoking about 19 years ago. She has a 13 pack-year smoking history. She has never used smokeless tobacco. She reports that she does not currently use alcohol. She reports that she does not use drugs.    Home Medications:  Prenate Mini, SUMAtriptan, aspirin, cetirizine,  fluconazole, lansoprazole, levonorgestrel-ethinyl estradiol, metFORMIN, omeprazole, sertraline, tretinoin, and triamcinolone      Allergies:  No Known Allergies    Objective   Objective     Vitals:   Temp:  [97.9 °F (36.6 °C)-98.6 °F (37 °C)] 98.6 °F (37 °C)  Heart Rate:  [79-92] 79  Resp:  [18] 18  BP: (110-121)/(76-82) 121/76  Physical Exam    Constitutional: Awake in bed, alert    Eyes: PERRLA, sclerae anicteric, no conjunctival injection   HENT: Normocephalic, atraumatic, mucous membranes moist   Neck: Supple, trachea midline   Respiratory:Equal chest rise, non-labored respirations    Cardiovascular: RRR, palpable radial pulses bilaterally   Gastrointestinal: Soft   Musculoskeletal: No bilateral ankle edema, no clubbing or cyanosis to extremities   Genitourinary: voiding, no flank pain   Psychiatric: Appropriate affect, cooperative   Neurologic: Oriented x 3, Cranial Nerves grossly intact, speech clear   Skin: No rashes     Result Review    Result Review:  I have personally reviewed the results from the time of this admission to 4/24/2024 13:10 EDT and agree with these findings:  [x]  Laboratory  []  Microbiology  []  Radiology  []  EKG/Telemetry   []  Cardiology/Vascular   []  Pathology  []  Old records  []  Other:    Most notable findings include: Right hydronephrosis on renal US    Assessment & Plan   Assessment / Plan     Brief Patient Summary:  Fanny Pizano is a 33 y.o. female who presented to [facility] from [facility] for further management of right flank pain and findings of right hydronephrosis on renal US. She is currently resting comfortably in bed. She denies any pain.     Active Hospital Problems:  Active Hospital Problems    Diagnosis     **Pyelonephritis      Plan:   -Repeat Renal US 04/25.  -Follow urine culture from outside facility.  -No urologic surgical intervention given patients vitals, labs and pain are stable and she is 15 weeks pregnant.    will follow, please call if any  questions.   D/w Dr. Ge.     DVT prophylaxis:  Medical DVT prophylaxis orders are present.        CODE STATUS:    Level Of Support Discussed With: Patient  Code Status (Patient has no pulse and is not breathing): CPR (Attempt to Resuscitate)  Medical Interventions (Patient has pulse or is breathing): Full Support    Admission Status:  I believe this patient meets inpatient status.    Electronically signed by GENEVA Hu, 04/24/24, 1:10 PM EDT.            No

## 2024-04-25 ENCOUNTER — APPOINTMENT (OUTPATIENT)
Dept: ULTRASOUND IMAGING | Facility: HOSPITAL | Age: 34
End: 2024-04-25
Payer: COMMERCIAL

## 2024-04-25 ENCOUNTER — READMISSION MANAGEMENT (OUTPATIENT)
Dept: CALL CENTER | Facility: HOSPITAL | Age: 34
End: 2024-04-25
Payer: COMMERCIAL

## 2024-04-25 VITALS
DIASTOLIC BLOOD PRESSURE: 68 MMHG | BODY MASS INDEX: 37.18 KG/M2 | WEIGHT: 236.9 LBS | HEIGHT: 67 IN | OXYGEN SATURATION: 95 % | RESPIRATION RATE: 18 BRPM | HEART RATE: 92 BPM | SYSTOLIC BLOOD PRESSURE: 114 MMHG | TEMPERATURE: 98 F

## 2024-04-25 LAB
ANION GAP SERPL CALCULATED.3IONS-SCNC: 12 MMOL/L (ref 5–15)
BASOPHILS # BLD AUTO: 0.03 10*3/MM3 (ref 0–0.2)
BASOPHILS NFR BLD AUTO: 0.3 % (ref 0–1.5)
BUN SERPL-MCNC: 6 MG/DL (ref 6–20)
BUN/CREAT SERPL: 14.3 (ref 7–25)
CALCIUM SPEC-SCNC: 7.6 MG/DL (ref 8.6–10.5)
CHLORIDE SERPL-SCNC: 106 MMOL/L (ref 98–107)
CO2 SERPL-SCNC: 20 MMOL/L (ref 22–29)
CREAT SERPL-MCNC: 0.42 MG/DL (ref 0.57–1)
CRP SERPL-MCNC: 1.13 MG/DL (ref 0–0.5)
DEPRECATED RDW RBC AUTO: 41 FL (ref 37–54)
EGFRCR SERPLBLD CKD-EPI 2021: 131 ML/MIN/1.73
EOSINOPHIL # BLD AUTO: 0.12 10*3/MM3 (ref 0–0.4)
EOSINOPHIL NFR BLD AUTO: 1.4 % (ref 0.3–6.2)
ERYTHROCYTE [DISTWIDTH] IN BLOOD BY AUTOMATED COUNT: 12.3 % (ref 12.3–15.4)
GLUCOSE SERPL-MCNC: 88 MG/DL (ref 65–99)
HCT VFR BLD AUTO: 29.4 % (ref 34–46.6)
HGB BLD-MCNC: 10.1 G/DL (ref 12–15.9)
IMM GRANULOCYTES # BLD AUTO: 0.04 10*3/MM3 (ref 0–0.05)
IMM GRANULOCYTES NFR BLD AUTO: 0.5 % (ref 0–0.5)
LYMPHOCYTES # BLD AUTO: 1.95 10*3/MM3 (ref 0.7–3.1)
LYMPHOCYTES NFR BLD AUTO: 22.2 % (ref 19.6–45.3)
MAGNESIUM SERPL-MCNC: 1.5 MG/DL (ref 1.6–2.6)
MCH RBC QN AUTO: 31.6 PG (ref 26.6–33)
MCHC RBC AUTO-ENTMCNC: 34.4 G/DL (ref 31.5–35.7)
MCV RBC AUTO: 91.9 FL (ref 79–97)
MONOCYTES # BLD AUTO: 0.63 10*3/MM3 (ref 0.1–0.9)
MONOCYTES NFR BLD AUTO: 7.2 % (ref 5–12)
NEUTROPHILS NFR BLD AUTO: 6.02 10*3/MM3 (ref 1.7–7)
NEUTROPHILS NFR BLD AUTO: 68.4 % (ref 42.7–76)
NRBC BLD AUTO-RTO: 0 /100 WBC (ref 0–0.2)
PHOSPHATE SERPL-MCNC: 3 MG/DL (ref 2.5–4.5)
PLATELET # BLD AUTO: 228 10*3/MM3 (ref 140–450)
PMV BLD AUTO: 10 FL (ref 6–12)
POTASSIUM SERPL-SCNC: 3.7 MMOL/L (ref 3.5–5.2)
RBC # BLD AUTO: 3.2 10*6/MM3 (ref 3.77–5.28)
SODIUM SERPL-SCNC: 138 MMOL/L (ref 136–145)
WBC NRBC COR # BLD AUTO: 8.79 10*3/MM3 (ref 3.4–10.8)

## 2024-04-25 PROCEDURE — 84100 ASSAY OF PHOSPHORUS: CPT | Performed by: INTERNAL MEDICINE

## 2024-04-25 PROCEDURE — 25010000002 ENOXAPARIN PER 10 MG: Performed by: INTERNAL MEDICINE

## 2024-04-25 PROCEDURE — 85025 COMPLETE CBC W/AUTO DIFF WBC: CPT | Performed by: INTERNAL MEDICINE

## 2024-04-25 PROCEDURE — 99232 SBSQ HOSP IP/OBS MODERATE 35: CPT | Performed by: NURSE PRACTITIONER

## 2024-04-25 PROCEDURE — 25810000003 SODIUM CHLORIDE 0.9 % SOLUTION: Performed by: INTERNAL MEDICINE

## 2024-04-25 PROCEDURE — 86140 C-REACTIVE PROTEIN: CPT | Performed by: INTERNAL MEDICINE

## 2024-04-25 PROCEDURE — 83735 ASSAY OF MAGNESIUM: CPT | Performed by: INTERNAL MEDICINE

## 2024-04-25 PROCEDURE — 76775 US EXAM ABDO BACK WALL LIM: CPT

## 2024-04-25 PROCEDURE — 25010000002 CEFTRIAXONE PER 250 MG: Performed by: INTERNAL MEDICINE

## 2024-04-25 PROCEDURE — 99239 HOSP IP/OBS DSCHRG MGMT >30: CPT | Performed by: INTERNAL MEDICINE

## 2024-04-25 PROCEDURE — 80048 BASIC METABOLIC PNL TOTAL CA: CPT | Performed by: INTERNAL MEDICINE

## 2024-04-25 RX ORDER — MAGNESIUM SULFATE HEPTAHYDRATE 40 MG/ML
2 INJECTION, SOLUTION INTRAVENOUS
Status: DISCONTINUED | OUTPATIENT
Start: 2024-04-25 | End: 2024-04-25

## 2024-04-25 RX ORDER — AMOXICILLIN AND CLAVULANATE POTASSIUM 875; 125 MG/1; MG/1
1 TABLET, FILM COATED ORAL 2 TIMES DAILY
Qty: 14 TABLET | Refills: 0 | Status: SHIPPED | OUTPATIENT
Start: 2024-04-25 | End: 2024-05-02

## 2024-04-25 RX ADMIN — CEFTRIAXONE 2000 MG: 2 INJECTION, POWDER, FOR SOLUTION INTRAMUSCULAR; INTRAVENOUS at 09:11

## 2024-04-25 RX ADMIN — ASPIRIN 81 MG CHEWABLE TABLET 81 MG: 81 TABLET CHEWABLE at 09:11

## 2024-04-25 RX ADMIN — SODIUM CHLORIDE 125 ML/HR: 9 INJECTION, SOLUTION INTRAVENOUS at 03:58

## 2024-04-25 RX ADMIN — CETIRIZINE HYDROCHLORIDE 10 MG: 10 TABLET, FILM COATED ORAL at 09:11

## 2024-04-25 RX ADMIN — Medication 10 ML: at 09:15

## 2024-04-25 RX ADMIN — ENOXAPARIN SODIUM 40 MG: 100 INJECTION SUBCUTANEOUS at 09:11

## 2024-04-25 NOTE — OUTREACH NOTE
Prep Survey      Flowsheet Row Responses   Hindu facility patient discharged from? [facility]   Is LACE score < 7 ? Yes   Eligibility [facility]   Date of Admission 04/24/24   Date of Discharge 04/25/24   Discharge Disposition Home or Self Care   Discharge diagnosis Pyelonephritis   Does the patient have one of the following disease processes/diagnoses(primary or secondary)? Other   Does the patient have Home health ordered? No   Is there a DME ordered? No   Prep survey completed? Yes            ALYSSIA SANFORD - Registered Nurse

## 2024-04-25 NOTE — PLAN OF CARE
Goal Outcome Evaluation:  Plan of Care Reviewed With: patient        Progress: no change  Outcome Evaluation: Patient's VSS. On room air. NSR on tele. No complaints of pain overnight. IVF currently infusing. Continue plan of care.

## 2024-04-25 NOTE — DISCHARGE SUMMARY
[facility] Medicine Services  DISCHARGE SUMMARY    Patient Name: Fanny Pizano  : 1990  MRN: [id number]    Date of Admission: 2024  5:30 AM  Date of Discharge: 2024  Primary Care Physician: Zaida Dickens DO    Consults       Date and Time Order Name Status Description    2024  9:31 AM Inpatient Urology Consult Completed             Hospital Course     Presenting Problem:     Active Hospital Problems    Diagnosis  POA    **Pyelonephritis [N12]  Yes      Resolved Hospital Problems   No resolved problems to display.      Discharge diagnosis:  Right hydronephrosis, improved  Possible right obstructive renal stone, resolved  UTI with presumed right pyelonephritis, improved    Hospital Course:  Fanny Pizano is a 33 y.o. female with no significant past medical history, who is currently 15 weeks pregnant presented to the hospital as a transfer from [facility] for right flank pain and hydronephrosis as seen on ultrasound right kidney.  By the time she arrived to [facility], her symptoms resolved.  Found to have UTI at outside hospital and started on IV Rocephin.  Repeat renal ultrasound with minimal hydronephrosis.  Evaluated by urology service while hospitalized and it was felt that her symptoms are related to renal stone that she passed.  No intervention warranted at this point in time.  I spoke to ER department in [facility] and her urine culture was pending at time of discharge.  Patient was discharged on 7-day of p.o. Augmentin and she will follow with her family doctor who will contact [facility] to get the culture and sensitivity of her urine sample over there.  Patient remained hemodynamically stable and afebrile and was discharged home in a stable condition       Discharge Follow Up Recommendations for outpatient labs/diagnostics:  PCP 1 week    Day of Discharge     HPI:   Patient seen and examined this morning.  No  fever.  No chills.  No urinary symptoms.  No flank pain    Review of Systems  General : no fevers, chills  CVS: No chest pain, palpitations  Respiratory: No cough, dyspnea  GI: No N/V/D, abd pain  10 point review of systems is negative except for what is mentioned in HPI    Vital Signs:   Temp:  [97.9 °F (36.6 °C)-98.6 °F (37 °C)] 98 °F (36.7 °C)  Heart Rate:  [79-92] 92  Resp:  [18] 18  BP: (106-121)/(65-76) 114/68      Physical Exam:  General: Comfortable, not in distress, conversant and cooperative  Head: Atraumatic and normocephalic  Eyes: No Icterus. No pallor  Ears:  Ears appear intact with no abnormalities noted  Throat: No oral lesions, no thrush  Neck: Supple, trachea midline  Lungs: Clear to auscultation bilaterally, equal air entry, no wheezing or crackles  Heart:  Normal S1 and S2, no murmur, no gallop, No JVD, no lower extremity swelling  Abdomen:  Soft, no tenderness, no organomegaly, normal bowel sounds, no organomegaly  Extremities: pulses equal bilaterally  Skin: No bleeding, bruising or rash, normal skin turgor and elasticity  Neurologic: Cranial nerves appear intact with no evidence of facial asymmetry, normal motor and sensory functions in all 4 extremities  Psych: Alert and oriented x 3, normal mood    Pertinent  and/or Most Recent Results     LAB RESULTS:      Lab 04/25/24  0550 04/24/24  0905   WBC 8.79 14.03*   HEMOGLOBIN 10.1* 10.7*   HEMATOCRIT 29.4* 30.5*   PLATELETS 228 219   NEUTROS ABS 6.02 10.58*   IMMATURE GRANS (ABS) 0.04 0.04   LYMPHS ABS 1.95 2.48   MONOS ABS 0.63 0.85   EOS ABS 0.12 0.06   MCV 91.9 89.7   CRP  --  0.82*   PROCALCITONIN  --  0.08         Lab 04/24/24  0905   SODIUM 138   POTASSIUM 3.8   CHLORIDE 106   CO2 21.0*   ANION GAP 11.0   BUN 7   CREATININE 0.56*   EGFR 122.2   GLUCOSE 92   CALCIUM 8.4*         Lab 04/24/24  0905   TOTAL PROTEIN 5.8*   ALBUMIN 3.4*   GLOBULIN 2.4   ALT (SGPT) 12   AST (SGOT) 18   BILIRUBIN 0.2   ALK PHOS 47                     Brief Urine  Lab Results       None          Microbiology Results (last 10 days)       ** No results found for the last 240 hours. **            US Renal Bilateral    Result Date: 4/25/2024  US RENAL BILATERAL Date of Exam: 4/25/2024 3:34 AM EDT Indication: Right flank pain, h/o of right hydronephrosis. Comparison: No comparisons available. Technique: Grayscale and color Doppler ultrasound evaluation of the kidneys and urinary bladder was performed. Findings: The right kidney measures 11.4 cm in length. There is borderline minimal right hydronephrosis with prominent distention of the upper renal pelvis. No suspicious lesion. No nephrolithiasis. The left kidney measures 12.1 cm in length. No evidence of hydronephrosis, nephrolithiasis, or suspicious renal mass. Poor visualization of the urinary bladder secondary to recent voiding     Impression: Borderline minimal right hydronephrosis with prominent distention of the upper renal pelvis. No nephrolithiasis or suspicious renal lesion. Electronically Signed: Sergio Fortune MD  4/25/2024 7:24 AM EDT  Workstation ID: [id number]                 Plan for Follow-up of Pending Labs/Results:   Pending Labs       Order Current Status    Basic Metabolic Panel In process    C-reactive Protein In process    Magnesium In process    Phosphorus In process          Discharge Details        Discharge Medications        New Medications        Instructions Start Date   amoxicillin-clavulanate 875-125 MG per tablet  Commonly known as: AUGMENTIN   1 tablet, Oral, 2 Times Daily             Continue These Medications        Instructions Start Date   aspirin 81 MG chewable tablet   81 mg, Oral, Daily      cetirizine 10 MG tablet  Commonly known as: zyrTEC   10 mg, Oral, Daily      lansoprazole 15 MG capsule  Commonly known as: PREVACID   15 mg, Oral, Daily      Prenate Mini 18-0.6-0.4-350 MG capsule   1 each, Oral, Daily             Stop These Medications      Falmina 0.1-20 MG-MCG per tablet  Generic drug:  levonorgestrel-ethinyl estradiol     fluconazole 150 MG tablet  Commonly known as: Diflucan     metFORMIN 500 MG tablet  Commonly known as: Glucophage     omeprazole 20 MG capsule  Commonly known as: priLOSEC     sertraline 100 MG tablet  Commonly known as: Zoloft     sertraline 50 MG tablet  Commonly known as: ZOLOFT     SUMAtriptan 50 MG tablet  Commonly known as: Imitrex     tretinoin 0.05 % cream  Commonly known as: RETIN-A     triamcinolone 0.1 % ointment  Commonly known as: KENALOG              No Known Allergies      Discharge Disposition:  Home or Self Care    Diet:  Hospital:  Diet Order   Procedures    Diet: Regular/House; Fluid Consistency: Thin (IDDSI 0)            Activity:  As tolerated    Restrictions or Other Recommendations:  None        CODE STATUS:    Code Status and Medical Interventions:   Ordered at: 04/24/24 0931     Level Of Support Discussed With:    Patient     Code Status (Patient has no pulse and is not breathing):    CPR (Attempt to Resuscitate)     Medical Interventions (Patient has pulse or is breathing):    Full Support       No future appointments.              Sen Headley MD  04/25/24      Time Spent on Discharge:  I spent  35  minutes on this discharge activity which included: face-to-face encounter with the patient, reviewing the data in the system, coordination of the care with the nursing staff as well as consultants, documentation, and entering orders.

## 2024-04-25 NOTE — PROGRESS NOTES
[facility]   Urology Progress Note    Patient Name: Fanny Pizano  : 1990  MRN: [id number]  Primary Care Physician:  Zaida Dickens DO  Date of admission: 2024    Subjective   Subjective     Chief Complaint: Right flank pain    HPI:  Patient resting comfortably in bed. She denies abdominal or flank pain. She denies dysuria or hematuria. She underwent repeat Renal US  which revealed borderline minimal right hydronephrosis. Cr and WBC stable.     Review of Systems   All systems were reviewed and negative except for: pregnant    Objective   Objective     Vitals:   Temp:  [97.9 °F (36.6 °C)-98.6 °F (37 °C)] 98 °F (36.7 °C)  Heart Rate:  [79-92] 92  Resp:  [18] 18  BP: (106-121)/(65-76) 114/68  Physical Exam    Constitutional: Awake in bed, alert   Eyes: PERRLA, sclerae anicteric, no conjunctival injection   HENT: Normocephalic, atraumatic, mucous membranes moist   Neck: Supple, trachea midline   Respiratory: Equal chest rise, non-labored respirations    Cardiovascular: RRR   Gastrointestinal: Soft, nontender, non-distended   Genitourinary: voiding no flank pain   Musculoskeletal: No lower extremity edema bilaterally, no clubbing or cyanosis to extremities   Psychiatric: Appropriate affect, cooperative   Neurologic: Oriented x 3,  Cranial Nerves grossly intact, speech clear   Skin: No rashes     Result Review    Result Review:  I have personally reviewed the results from the time of this admission to 2024 09:10 EDT and agree with these findings:  [x]  Laboratory  []  Microbiology  [x]  Radiology  []  EKG/Telemetry   []  Cardiology/Vascular   []  Pathology  []  Old records  []  Other:    Most notable findings include: Minimal right hydronephrosis.     Assessment & Plan   Assessment / Plan     Brief Patient Summary:  Fanny Pizaon is a 33 y.o. female who [facility] of right flank pain and findings of right hydronephrosis on  renal US.     She is currently resting comfortably in bed. She denies any pain. WBC stable, 8.79. Repeat renal US with minimal right hydronephrosis.     Active Hospital Problems:  Active Hospital Problems    Diagnosis     **Pyelonephritis        Plan:   -Ok to discharge home from Urology standpoint.   -Plan for PO abx at discharge per primary team.   - will sign off, thank you for the consult.   D/w Dr. Ge.        DVT prophylaxis:  Medical DVT prophylaxis orders are present.        CODE STATUS:   Level Of Support Discussed With: Patient  Code Status (Patient has no pulse and is not breathing): CPR (Attempt to Resuscitate)  Medical Interventions (Patient has pulse or is breathing): Full Support    Disposition:  I expect patient to discharge home 04/25.    Electronically signed by GENEVA Hu, 04/25/24, 9:10 AM EDT.

## 2024-04-25 NOTE — CASE MANAGEMENT/SOCIAL WORK
Case Management Discharge Note      Final Note: Discharge today. Patient had no discharge needs from CM standpoint.         Selected Continued Care - Discharged on 4/25/2024 Admission date: 4/24/2024 - Discharge disposition: Home or Self Care      Destination    No services have been selected for the patient.                Durable Medical Equipment    No services have been selected for the patient.                Dialysis/Infusion    No services have been selected for the patient.                Home Medical Care    No services have been selected for the patient.                Therapy    No services have been selected for the patient.                Community Resources    No services have been selected for the patient.                Community & DME    No services have been selected for the patient.                         Final Discharge Disposition Code: 01 - home or self-care

## 2024-04-26 ENCOUNTER — TRANSITIONAL CARE MANAGEMENT TELEPHONE ENCOUNTER (OUTPATIENT)
Dept: CALL CENTER | Facility: HOSPITAL | Age: 34
End: 2024-04-26
Payer: COMMERCIAL

## 2024-04-26 NOTE — OUTREACH NOTE
Call Center TCM Note      Flowsheet Row Responses   [facility] patient discharged from? [place]   Does the patient have one of the following disease processes/diagnoses(primary or secondary)? Other   TCM attempt successful? Yes   Call start time 1615   Call end time 1616   Discharge diagnosis Pyelonephritis   Meds reviewed with patient/caregiver? Yes   Is the patient having any side effects they believe may be caused by any medication additions or changes? No   Does the patient have all medications ordered at discharge? Yes   Is the patient taking all medications as directed (includes completed medication regime)? Yes   Comments HOSP DC FU appt 5/7/24 830 am   Has home health visited the patient within 72 hours of discharge? N/A   Psychosocial issues? No   Did the patient receive a copy of their discharge instructions? Yes   Nursing interventions Reviewed instructions with patient   What is the patient's perception of their health status since discharge? Improving   Is the patient/caregiver able to teach back signs and symptoms related to disease process for when to call PCP? Yes   Is the patient/caregiver able to teach back signs and symptoms related to disease process for when to call 911? Yes   Is the patient/caregiver able to teach back the hierarchy of who to call/visit for symptoms/problems? PCP, Specialist, Home health nurse, Urgent Care, ED, 911 Yes   TCM call completed? Yes   Wrap up additional comments Pt reports she is doing well.   Call end time 1616            Sera Mcmullen RN    4/26/2024, 16:16 EDT

## 2024-04-26 NOTE — PAYOR COMM NOTE
"Fanny Pizano (33 y.o. Female)     [id number]     Lawanda Diaz, PAULA  Utilization Review  [phone]  [phone]        Date of Birth   1990    Social Security Number       Address   [address] [place] [id number]    Home Phone   [phone]    MRN   [id number]       Voodoo   None    Marital Status                               Admission Date   24    Admission Type   Urgent    Admitting Provider   Sen Headley MD    Attending Provider       Department, Room/Bed   [facility] 6B, N642/1       Discharge Date   2024    Discharge Disposition   Home or Self Care    Discharge Destination   Home                              Attending Provider: (none)   Allergies: No Known Allergies    Isolation: None   Infection: None   Code Status: Prior    Ht: 170.2 cm (67\")   Wt: 107 kg (236 lb 14.4 oz)    Admission Cmt: None   Principal Problem: Pyelonephritis [N12]                   Active Insurance as of 2024       Primary Coverage       Payor Plan Insurance Group Employer/Plan Group    [employer] BLUE CROSS [employer] EMPL[employer]EE [id number]       Payor Plan Address Payor Plan Phone Number Payor Plan Fax Number Effective Dates    [address] [phone]  2020 - None Entered    [place] [address]         Subscriber Name Subscriber Birth Date Member ID       CHELITA PIZANO 10/10/1974 [id number]                     Emergency Contacts        (Rel.) Home Phone Work Phone Mobile Phone    CHELITA PIZANO (Spouse) [phone] -- --                 Discharge Summary        Sen Headley MD at 24 [phone]              [facility] Medicine Services  DISCHARGE SUMMARY    Patient Name: Fanny Pizano  : 1990  MRN: [id number]    Date of Admission: 2024  5:30 AM  Date of Discharge: 2024  Primary Care Physician: Zaida Dickens DO    Consults       Date and Time Order Name Status " Description    4/24/2024  9:31 AM Inpatient Urology Consult Completed             Hospital Course     Presenting Problem:     Active Hospital Problems    Diagnosis  POA    **Pyelonephritis [N12]  Yes      Resolved Hospital Problems   No resolved problems to display.      Discharge diagnosis:  Right hydronephrosis, improved  Possible right obstructive renal stone, resolved  UTI with presumed right pyelonephritis, improved    Hospital Course:  Fanny Pizano is a 33 y.o. female with no significant past medical history, who is currently 15 weeks pregnant presented to the hospital as a transfer from [facility] for right flank pain and hydronephrosis as seen on ultrasound right kidney.  By the time she arrived to [facility], her symptoms resolved.  Found to have UTI at outside hospital and started on IV Rocephin.  Repeat renal ultrasound with minimal hydronephrosis.  Evaluated by urology service while hospitalized and it was felt that her symptoms are related to renal stone that she passed.  No intervention warranted at this point in time.  I spoke to ER department in [facility] and her urine culture was pending at time of discharge.  Patient was discharged on 7-day of p.o. Augmentin and she will follow with her family doctor who will contact [facility] to get the culture and sensitivity of her urine sample over there.  Patient remained hemodynamically stable and afebrile and was discharged home in a stable condition       Discharge Follow Up Recommendations for outpatient labs/diagnostics:  PCP 1 week    Day of Discharge     HPI:   Patient seen and examined this morning.  No fever.  No chills.  No urinary symptoms.  No flank pain    Review of Systems  General : no fevers, chills  CVS: No chest pain, palpitations  Respiratory: No cough, dyspnea  GI: No N/V/D, abd pain  10 point review of systems is negative except for what is mentioned in HPI    Vital Signs:   Temp:  [97.9 °F (36.6  °C)-98.6 °F (37 °C)] 98 °F (36.7 °C)  Heart Rate:  [79-92] 92  Resp:  [18] 18  BP: (106-121)/(65-76) 114/68      Physical Exam:  General: Comfortable, not in distress, conversant and cooperative  Head: Atraumatic and normocephalic  Eyes: No Icterus. No pallor  Ears:  Ears appear intact with no abnormalities noted  Throat: No oral lesions, no thrush  Neck: Supple, trachea midline  Lungs: Clear to auscultation bilaterally, equal air entry, no wheezing or crackles  Heart:  Normal S1 and S2, no murmur, no gallop, No JVD, no lower extremity swelling  Abdomen:  Soft, no tenderness, no organomegaly, normal bowel sounds, no organomegaly  Extremities: pulses equal bilaterally  Skin: No bleeding, bruising or rash, normal skin turgor and elasticity  Neurologic: Cranial nerves appear intact with no evidence of facial asymmetry, normal motor and sensory functions in all 4 extremities  Psych: Alert and oriented x 3, normal mood    Pertinent  and/or Most Recent Results     LAB RESULTS:      Lab 04/25/24  0550 04/24/24  0905   WBC 8.79 14.03*   HEMOGLOBIN 10.1* 10.7*   HEMATOCRIT 29.4* 30.5*   PLATELETS 228 219   NEUTROS ABS 6.02 10.58*   IMMATURE GRANS (ABS) 0.04 0.04   LYMPHS ABS 1.95 2.48   MONOS ABS 0.63 0.85   EOS ABS 0.12 0.06   MCV 91.9 89.7   CRP  --  0.82*   PROCALCITONIN  --  0.08         Lab 04/24/24  0905   SODIUM 138   POTASSIUM 3.8   CHLORIDE 106   CO2 21.0*   ANION GAP 11.0   BUN 7   CREATININE 0.56*   EGFR 122.2   GLUCOSE 92   CALCIUM 8.4*         Lab 04/24/24  0905   TOTAL PROTEIN 5.8*   ALBUMIN 3.4*   GLOBULIN 2.4   ALT (SGPT) 12   AST (SGOT) 18   BILIRUBIN 0.2   ALK PHOS 47                     Brief Urine Lab Results       None          Microbiology Results (last 10 days)       ** No results found for the last 240 hours. **            US Renal Bilateral    Result Date: 4/25/2024  US RENAL BILATERAL Date of Exam: 4/25/2024 3:34 AM EDT Indication: Right flank pain, h/o of right hydronephrosis. Comparison: No  comparisons available. Technique: Grayscale and color Doppler ultrasound evaluation of the kidneys and urinary bladder was performed. Findings: The right kidney measures 11.4 cm in length. There is borderline minimal right hydronephrosis with prominent distention of the upper renal pelvis. No suspicious lesion. No nephrolithiasis. The left kidney measures 12.1 cm in length. No evidence of hydronephrosis, nephrolithiasis, or suspicious renal mass. Poor visualization of the urinary bladder secondary to recent voiding     Impression: Borderline minimal right hydronephrosis with prominent distention of the upper renal pelvis. No nephrolithiasis or suspicious renal lesion. Electronically Signed: Sergio Fortune MD  4/25/2024 7:24 AM EDT  Workstation ID: [id number]                 Plan for Follow-up of Pending Labs/Results:   Pending Labs       Order Current Status    Basic Metabolic Panel In process    C-reactive Protein In process    Magnesium In process    Phosphorus In process          Discharge Details        Discharge Medications        New Medications        Instructions Start Date   amoxicillin-clavulanate 875-125 MG per tablet  Commonly known as: AUGMENTIN   1 tablet, Oral, 2 Times Daily             Continue These Medications        Instructions Start Date   aspirin 81 MG chewable tablet   81 mg, Oral, Daily      cetirizine 10 MG tablet  Commonly known as: zyrTEC   10 mg, Oral, Daily      lansoprazole 15 MG capsule  Commonly known as: PREVACID   15 mg, Oral, Daily      Prenate Mini 18-0.6-0.4-350 MG capsule   1 each, Oral, Daily             Stop These Medications      Falmina 0.1-20 MG-MCG per tablet  Generic drug: levonorgestrel-ethinyl estradiol     fluconazole 150 MG tablet  Commonly known as: Diflucan     metFORMIN 500 MG tablet  Commonly known as: Glucophage     omeprazole 20 MG capsule  Commonly known as: priLOSEC     sertraline 100 MG tablet  Commonly known as: Zoloft     sertraline 50 MG tablet  Commonly known  as: ZOLOFT     SUMAtriptan 50 MG tablet  Commonly known as: Imitrex     tretinoin 0.05 % cream  Commonly known as: RETIN-A     triamcinolone 0.1 % ointment  Commonly known as: KENALOG              No Known Allergies      Discharge Disposition:  Home or Self Care    Diet:  Hospital:  Diet Order   Procedures    Diet: Regular/House; Fluid Consistency: Thin (IDDSI 0)            Activity:  As tolerated    Restrictions or Other Recommendations:  None        CODE STATUS:    Code Status and Medical Interventions:   Ordered at: 04/24/24 0931     Level Of Support Discussed With:    Patient     Code Status (Patient has no pulse and is not breathing):    CPR (Attempt to Resuscitate)     Medical Interventions (Patient has pulse or is breathing):    Full Support       No future appointments.              Sen Headley MD  04/25/24      Time Spent on Discharge:  I spent  35  minutes on this discharge activity which included: face-to-face encounter with the patient, reviewing the data in the system, coordination of the care with the nursing staff as well as consultants, documentation, and entering orders.            Electronically signed by Sen Headley MD at 04/25/24 1337

## 2024-04-26 NOTE — OUTREACH NOTE
Call Center TCM Note      Flowsheet Row Responses   [facility] patient discharged from? [place]   Does the patient have one of the following disease processes/diagnoses(primary or secondary)? Other   TCM attempt successful? No   Unsuccessful attempts Attempt 1  [attempted pt and ]   Call Status Left message            Tona Bales RN    4/26/2024, 15:18 EDT

## 2024-05-07 ENCOUNTER — OFFICE VISIT (OUTPATIENT)
Dept: FAMILY MEDICINE CLINIC | Facility: CLINIC | Age: 34
End: 2024-05-07
Payer: COMMERCIAL

## 2024-05-07 VITALS
SYSTOLIC BLOOD PRESSURE: 116 MMHG | HEIGHT: 67 IN | BODY MASS INDEX: 36.57 KG/M2 | HEART RATE: 96 BPM | WEIGHT: 233 LBS | DIASTOLIC BLOOD PRESSURE: 64 MMHG | OXYGEN SATURATION: 97 %

## 2024-05-07 DIAGNOSIS — Z00.00 ENCOUNTER FOR WELLNESS EXAMINATION IN ADULT: ICD-10-CM

## 2024-05-07 DIAGNOSIS — J30.2 SEASONAL ALLERGIES: ICD-10-CM

## 2024-05-07 DIAGNOSIS — Z09 HOSPITAL DISCHARGE FOLLOW-UP: Primary | ICD-10-CM

## 2024-05-07 DIAGNOSIS — E83.42 HYPOMAGNESEMIA: ICD-10-CM

## 2024-05-07 PROCEDURE — 99495 TRANSJ CARE MGMT MOD F2F 14D: CPT | Performed by: STUDENT IN AN ORGANIZED HEALTH CARE EDUCATION/TRAINING PROGRAM

## 2024-05-07 RX ORDER — MELATONIN
1000 DAILY
COMMUNITY

## 2024-05-08 LAB
ALBUMIN SERPL-MCNC: 3.7 G/DL (ref 3.9–4.9)
ALBUMIN/GLOB SERPL: 1.5 {RATIO} (ref 1.2–2.2)
ALP SERPL-CCNC: 60 IU/L (ref 44–121)
ALT SERPL-CCNC: 18 IU/L (ref 0–32)
AST SERPL-CCNC: 16 IU/L (ref 0–40)
BASOPHILS # BLD AUTO: 0 X10E3/UL (ref 0–0.2)
BASOPHILS NFR BLD AUTO: 0 %
BILIRUB SERPL-MCNC: <0.2 MG/DL (ref 0–1.2)
BUN SERPL-MCNC: 6 MG/DL (ref 6–20)
BUN/CREAT SERPL: 10 (ref 9–23)
CALCIUM SERPL-MCNC: 9 MG/DL (ref 8.7–10.2)
CHLORIDE SERPL-SCNC: 104 MMOL/L (ref 96–106)
CHOLEST SERPL-MCNC: 208 MG/DL (ref 100–199)
CO2 SERPL-SCNC: 19 MMOL/L (ref 20–29)
CREAT SERPL-MCNC: 0.63 MG/DL (ref 0.57–1)
EGFRCR SERPLBLD CKD-EPI 2021: 119 ML/MIN/1.73
EOSINOPHIL # BLD AUTO: 0.1 X10E3/UL (ref 0–0.4)
EOSINOPHIL NFR BLD AUTO: 1 %
ERYTHROCYTE [DISTWIDTH] IN BLOOD BY AUTOMATED COUNT: 12.5 % (ref 11.7–15.4)
GLOBULIN SER CALC-MCNC: 2.4 G/DL (ref 1.5–4.5)
GLUCOSE SERPL-MCNC: 100 MG/DL (ref 70–99)
HBA1C MFR BLD: 5.5 % (ref 4.8–5.6)
HCT VFR BLD AUTO: 35.7 % (ref 34–46.6)
HDLC SERPL-MCNC: 50 MG/DL
HGB BLD-MCNC: 11.7 G/DL (ref 11.1–15.9)
IMM GRANULOCYTES # BLD AUTO: 0.1 X10E3/UL (ref 0–0.1)
IMM GRANULOCYTES NFR BLD AUTO: 1 %
LDLC SERPL CALC-MCNC: 130 MG/DL (ref 0–99)
LYMPHOCYTES # BLD AUTO: 1 X10E3/UL (ref 0.7–3.1)
LYMPHOCYTES NFR BLD AUTO: 10 %
MCH RBC QN AUTO: 31.5 PG (ref 26.6–33)
MCHC RBC AUTO-ENTMCNC: 32.8 G/DL (ref 31.5–35.7)
MCV RBC AUTO: 96 FL (ref 79–97)
MONOCYTES # BLD AUTO: 0.7 X10E3/UL (ref 0.1–0.9)
MONOCYTES NFR BLD AUTO: 7 %
NEUTROPHILS # BLD AUTO: 8.2 X10E3/UL (ref 1.4–7)
NEUTROPHILS NFR BLD AUTO: 81 %
PLATELET # BLD AUTO: 237 X10E3/UL (ref 150–450)
POTASSIUM SERPL-SCNC: 3.5 MMOL/L (ref 3.5–5.2)
PROT SERPL-MCNC: 6.1 G/DL (ref 6–8.5)
RBC # BLD AUTO: 3.71 X10E6/UL (ref 3.77–5.28)
SODIUM SERPL-SCNC: 138 MMOL/L (ref 134–144)
TRIGL SERPL-MCNC: 159 MG/DL (ref 0–149)
VLDLC SERPL CALC-MCNC: 28 MG/DL (ref 5–40)
WBC # BLD AUTO: 10.2 X10E3/UL (ref 3.4–10.8)

## 2024-05-09 LAB — MAGNESIUM SERPL-MCNC: 1.7 MG/DL (ref 1.6–2.3)

## 2024-07-26 ENCOUNTER — TELEPHONE (OUTPATIENT)
Dept: FAMILY MEDICINE CLINIC | Facility: CLINIC | Age: 34
End: 2024-07-26

## 2024-07-26 NOTE — TELEPHONE ENCOUNTER
Caller: Fanny Pizano    Relationship: Self    Best call back number: [phone]    What orders are you requesting (i.e. lab or imaging: FULL WORK UP OF LABS PRIOR TO APPOINTMENT IN OCTOBER     In what timeframe would the patient need to come in: DAYS BEFORE PHYSICAL IN OCTOBER.     Where will you receive your lab/imaging services: [place] OFFICE     Additional notes: PATIENT STATES SHE HAS A FOLLOW UP 10/15/2024 AND WANTS TO GET HER LABS DRAWN BEFORE THIS APPOINTMENT SO THE DR HAS THEM FOR HER APPOINTMENT. SHE WANTS TO KNOW IF SHE CAN GET THE ORDER AND COME IN THEN.

## 2024-07-29 DIAGNOSIS — Z00.00 ENCOUNTER FOR WELLNESS EXAMINATION IN ADULT: Primary | ICD-10-CM

## 2024-12-16 ENCOUNTER — TELEMEDICINE (OUTPATIENT)
Dept: FAMILY MEDICINE CLINIC | Facility: TELEHEALTH | Age: 34
End: 2024-12-16
Payer: COMMERCIAL

## 2024-12-16 DIAGNOSIS — J03.90 TONSILLITIS: Primary | ICD-10-CM

## 2024-12-16 PROBLEM — O24.419 GESTATIONAL DIABETES MELLITUS: Status: ACTIVE | Noted: 2019-11-29

## 2024-12-16 PROBLEM — I15.1: Status: ACTIVE | Noted: 2019-11-29

## 2024-12-16 PROBLEM — K35.80 ACUTE APPENDICITIS: Status: ACTIVE | Noted: 2020-03-03

## 2024-12-16 PROBLEM — Z37.9 NORMAL LABOR: Status: ACTIVE | Noted: 2019-12-08

## 2024-12-16 PROBLEM — O09.529 MULTIGRAVIDA OF ADVANCED MATERNAL AGE: Status: ACTIVE | Noted: 2024-09-02

## 2024-12-16 PROBLEM — R10.9 RIGHT-SIDED ABDOMINAL PAIN OF UNKNOWN CAUSE: Status: ACTIVE | Noted: 2024-12-16

## 2024-12-16 PROBLEM — N13.30 HYDRONEPHROSIS: Status: ACTIVE | Noted: 2024-04-24

## 2024-12-16 PROBLEM — N20.1 URETERIC STONE: Status: ACTIVE | Noted: 2024-04-24

## 2024-12-16 PROBLEM — K37 APPENDICITIS: Status: ACTIVE | Noted: 2024-12-16

## 2024-12-16 PROBLEM — R10.31 RIGHT LOWER QUADRANT PAIN: Status: ACTIVE | Noted: 2020-03-03

## 2024-12-16 PROBLEM — A49.1 STREPTOCOCCUS AGALACTIAE INFECTION: Status: ACTIVE | Noted: 2019-12-10

## 2024-12-16 PROBLEM — O10.419: Status: ACTIVE | Noted: 2019-11-29

## 2024-12-16 PROCEDURE — 99213 OFFICE O/P EST LOW 20 MIN: CPT | Performed by: NURSE PRACTITIONER

## 2024-12-16 RX ORDER — AMOXICILLIN 500 MG/1
500 CAPSULE ORAL 2 TIMES DAILY
Qty: 20 CAPSULE | Refills: 0 | Status: SHIPPED | OUTPATIENT
Start: 2024-12-16 | End: 2024-12-26

## 2024-12-16 NOTE — PROGRESS NOTES
You have chosen to receive care through a telehealth visit.  Do you consent to use a video/audio connection for your medical care today? Yes     CHIEF COMPLAINT  Chief Complaint   Patient presents with    Sore Throat         HPI  Fanny Pizano is a 34 y.o. female  presents with complaint of sore throat. Reports her symptoms started all of a sudden tonight. Reports her throat is sore when she swallows. Reports no fever or chills. No nausea or vomiting. No SOA or CP. Reports she has gargled some salt water for pain relief. Patient delivered baby in sept 2024     Review of Systems   Constitutional:  Negative for chills, fatigue and fever.   HENT:  Positive for sneezing and sore throat. Negative for congestion, ear discharge, ear pain, sinus pressure and sinus pain.    Respiratory:  Negative for cough, chest tightness, shortness of breath and wheezing.    Cardiovascular:  Negative for chest pain.   Gastrointestinal:  Negative for abdominal pain, diarrhea, nausea and vomiting.   Musculoskeletal:  Negative for back pain and myalgias.   Neurological:  Negative for dizziness and headaches.   Psychiatric/Behavioral: Negative.         Past Medical History:   Diagnosis Date    Allergic     Seasonal/Nickel    Anxiety     Headache     Hyperlipidemia     Kidney stone     Obesity     Strabismus     Tobacco dependence syndrome     Visual impairment        Family History   Problem Relation Age of Onset    Graves' disease Mother     Anxiety disorder Mother     Depression Mother     Diabetes Mother     Hyperlipidemia Mother     Thyroid disease Mother         Graves Disease    Breast cancer Paternal Grandmother     Cancer Paternal Grandmother         Breast    Coronary artery disease Other     Diabetes Other     Cancer Father         Stomach    Vision loss Father         Histoplasmosis    Cancer Maternal Grandmother         Breast/lung    Hyperlipidemia Maternal Grandmother     Thyroid disease Maternal Grandmother         Low  functioning thyroid    Alcohol abuse Paternal Grandfather        Social History     Socioeconomic History    Marital status:    Tobacco Use    Smoking status: Former     Current packs/day: 0.00     Average packs/day: 1 pack/day for 13.0 years (13.0 ttl pk-yrs)     Types: Cigarettes     Start date: 2005     Quit date: 2018     Years since quittin.9    Smokeless tobacco: Never   Vaping Use    Vaping status: Never Used   Substance and Sexual Activity    Alcohol use: Not Currently    Drug use: Never    Sexual activity: Yes     Partners: Male       Fanny Pizano  reports that she quit smoking about 6 years ago. Her smoking use included cigarettes. She started smoking about 19 years ago. She has a 13 pack-year smoking history. She has never used smokeless tobacco. I have educated her on the risk of diseases from using tobacco products such as cancer, COPD, and heart disease.     I advised her to quit vaping     I spent  1  minutes counseling the patient.              LMP 2024   Breastfeeding No     PHYSICAL EXAM  Physical Exam   Constitutional: She is oriented to person, place, and time. She appears well-developed and well-nourished. No distress.   HENT:   Head: Normocephalic and atraumatic.   Right Ear: Hearing normal.   Left Ear: Hearing normal.   Nose: No congestion.   Mouth/Throat: Mouth/Lips are normal.Oropharyngeal exudate present.   Patient directed exam   Throat with redness on the right tonsil.   The left tonsil has white patches  Tonsils are mildly swollen    Eyes: Conjunctivae and lids are normal.   Pulmonary/Chest: Effort normal.  No respiratory distress.  Lymphadenopathy:        Right cervical: Anterior cervical adenopathy present.        Left cervical: Anterior cervical adenopathy present.   Patient directed exam   Mildly enlarged cervical lymph nodes    Neurological: She is alert and oriented to person, place, and time.   Psychiatric: She has a normal mood and affect. Her speech is  normal and behavior is normal.       Results for orders placed or performed in visit on 05/07/24   Comprehensive Metabolic Panel    Collection Time: 05/07/24  8:44 AM    Specimen: Arm, Right; Blood   Result Value Ref Range    Glucose 100 (H) 70 - 99 mg/dL    BUN 6 6 - 20 mg/dL    Creatinine 0.63 0.57 - 1.00 mg/dL    EGFR Result 119 >59 mL/min/1.73    BUN/Creatinine Ratio 10 9 - 23    Sodium 138 134 - 144 mmol/L    Potassium 3.5 3.5 - 5.2 mmol/L    Chloride 104 96 - 106 mmol/L    Total CO2 19 (L) 20 - 29 mmol/L    Calcium 9.0 8.7 - 10.2 mg/dL    Total Protein 6.1 6.0 - 8.5 g/dL    Albumin 3.7 (L) 3.9 - 4.9 g/dL    Globulin 2.4 1.5 - 4.5 g/dL    A/G Ratio 1.5 1.2 - 2.2    Total Bilirubin <0.2 0.0 - 1.2 mg/dL    Alkaline Phosphatase 60 44 - 121 IU/L    AST (SGOT) 16 0 - 40 IU/L    ALT (SGPT) 18 0 - 32 IU/L   Lipid Panel    Collection Time: 05/07/24  8:44 AM    Specimen: Arm, Right; Blood   Result Value Ref Range    Total Cholesterol 208 (H) 100 - 199 mg/dL    Triglycerides 159 (H) 0 - 149 mg/dL    HDL Cholesterol 50 >39 mg/dL    VLDL Cholesterol Narciso 28 5 - 40 mg/dL    LDL Chol Calc (NIH) 130 (H) 0 - 99 mg/dL   Hemoglobin A1c    Collection Time: 05/07/24  8:44 AM    Specimen: Arm, Right; Blood   Result Value Ref Range    Hemoglobin A1C 5.5 4.8 - 5.6 %   Magnesium    Collection Time: 05/07/24  8:44 AM    Specimen: Arm, Right; Blood   Result Value Ref Range    Magnesium 1.7 1.6 - 2.3 mg/dL   CBC Auto Differential    Collection Time: 05/07/24  8:44 AM    Specimen: Arm, Right; Blood   Result Value Ref Range    WBC 10.2 3.4 - 10.8 x10E3/uL    RBC 3.71 (L) 3.77 - 5.28 x10E6/uL    Hemoglobin 11.7 11.1 - 15.9 g/dL    Hematocrit 35.7 34.0 - 46.6 %    MCV 96 79 - 97 fL    MCH 31.5 26.6 - 33.0 pg    MCHC 32.8 31.5 - 35.7 g/dL    RDW 12.5 11.7 - 15.4 %    Platelets 237 150 - 450 x10E3/uL    Neutrophil Rel % 81 Not Estab. %    Lymphocyte Rel % 10 Not Estab. %    Monocyte Rel % 7 Not Estab. %    Eosinophil Rel % 1 Not Estab. %     Basophil Rel % 0 Not Estab. %    Neutrophils Absolute 8.2 (H) 1.4 - 7.0 x10E3/uL    Lymphocytes Absolute 1.0 0.7 - 3.1 x10E3/uL    Monocytes Absolute 0.7 0.1 - 0.9 x10E3/uL    Eosinophils Absolute 0.1 0.0 - 0.4 x10E3/uL    Basophils Absolute 0.0 0.0 - 0.2 x10E3/uL    Immature Granulocyte Rel % 1 Not Estab. %    Immature Grans Absolute 0.1 0.0 - 0.1 x10E3/uL       Diagnoses and all orders for this visit:    1. Tonsillitis (Primary)    Other orders  -     amoxicillin (AMOXIL) 500 MG capsule; Take 1 capsule by mouth 2 (Two) Times a Day for 10 days.  Dispense: 20 capsule; Refill: 0    Rest  Fluids  Take COVID test at home   PCP if symptoms continue   ER for any worsening symptoms such as high fever, drooling, chest pain or SOA       FOLLOW-UP  As discussed during visit with PCP/[facility] if no improvement or Urgent Care/Emergency Department if worsening of symptoms    Patient verbalizes understanding of medication dosage, comfort measures, instructions for treatment and follow-up.    Annie Melendez, APRN  12/16/2024  01:41 EST    Mode of Visit: Video  Location of patient: -HOME-  Location of provider: +HOME+  You have chosen to receive care through a telehealth visit.  The patient has signed the video visit consent form.  The visit included audio and video interaction. No technical issues occurred during this visit.      Note Disclaimer: At [facility], we believe that sharing information builds trust and better   relationships. You are receiving this note because you recently visited [facility]. It is possible you   will see health information before a provider has talked with you about it. This kind of information can   be easy to misunderstand. To help you fully understand what it means for your health, we urge you to   discuss this note with your provider.

## 2025-04-13 ENCOUNTER — TELEMEDICINE (OUTPATIENT)
Dept: FAMILY MEDICINE CLINIC | Facility: TELEHEALTH | Age: 35
End: 2025-04-13
Payer: COMMERCIAL

## 2025-04-13 DIAGNOSIS — K52.9 GASTROENTERITIS: Primary | ICD-10-CM

## 2025-04-13 RX ORDER — ONDANSETRON 8 MG/1
8 TABLET, ORALLY DISINTEGRATING ORAL EVERY 8 HOURS PRN
Qty: 15 TABLET | Refills: 0 | Status: SHIPPED | OUTPATIENT
Start: 2025-04-13 | End: 2025-04-18

## 2025-04-13 NOTE — PROGRESS NOTES
You have chosen to receive care through a telehealth visit.  Do you consent to use a video/audio connection for your medical care today? Yes     CHIEF COMPLAINT  Chief Complaint   Patient presents with    Vomiting         HPI  Fanny Pizano is a 34 y.o. female  presents with complaint of vomiting that started last night.  She denies diarrhea.  She states the she ate at [employer] in [place] and had the salad bar and thinks she may have gotten food poisoning.    Review of Systems   Gastrointestinal:  Positive for vomiting.   All other systems reviewed and are negative.      Past Medical History:   Diagnosis Date    Allergic     Seasonal/Nickel    Anxiety     Headache     Hyperlipidemia     Kidney stone     Obesity     Strabismus     Tobacco dependence syndrome     Visual impairment        Family History   Problem Relation Age of Onset    Graves' disease Mother     Anxiety disorder Mother     Depression Mother     Diabetes Mother     Hyperlipidemia Mother     Thyroid disease Mother         Graves Disease    Breast cancer Paternal Grandmother     Cancer Paternal Grandmother         Breast    Coronary artery disease Other     Diabetes Other     Cancer Father         Stomach    Vision loss Father         Histoplasmosis    Cancer Maternal Grandmother         Breast/lung    Hyperlipidemia Maternal Grandmother     Thyroid disease Maternal Grandmother         Low functioning thyroid    Alcohol abuse Paternal Grandfather        Social History     Socioeconomic History    Marital status:    Tobacco Use    Smoking status: Former     Current packs/day: 0.00     Average packs/day: 1 pack/day for 13.0 years (13.0 ttl pk-yrs)     Types: Cigarettes     Start date: 2005     Quit date: 2018     Years since quittin.2    Smokeless tobacco: Never   Vaping Use    Vaping status: Never Used   Substance and Sexual Activity    Alcohol use: Not Currently    Drug use: Never    Sexual activity: Yes     Partners:  Male       Fanny Pizano  reports that she quit smoking about 7 years ago. Her smoking use included cigarettes. She started smoking about 20 years ago. She has a 13 pack-year smoking history. She has never used smokeless tobacco.        There were no vitals taken for this visit.    PHYSICAL EXAM  Physical Exam   Constitutional: She appears well-developed and well-nourished.   HENT:   Head: Normocephalic.   Eyes: Pupils are equal, round, and reactive to light.   Pulmonary/Chest: Effort normal.   Musculoskeletal: Normal range of motion.   Neurological: She is alert.   Psychiatric: She has a normal mood and affect.       Results for orders placed or performed in visit on 05/07/24   Comprehensive Metabolic Panel    Collection Time: 05/07/24  8:44 AM    Specimen: Arm, Right; Blood   Result Value Ref Range    Glucose 100 (H) 70 - 99 mg/dL    BUN 6 6 - 20 mg/dL    Creatinine 0.63 0.57 - 1.00 mg/dL    EGFR Result 119 >59 mL/min/1.73    BUN/Creatinine Ratio 10 9 - 23    Sodium 138 134 - 144 mmol/L    Potassium 3.5 3.5 - 5.2 mmol/L    Chloride 104 96 - 106 mmol/L    Total CO2 19 (L) 20 - 29 mmol/L    Calcium 9.0 8.7 - 10.2 mg/dL    Total Protein 6.1 6.0 - 8.5 g/dL    Albumin 3.7 (L) 3.9 - 4.9 g/dL    Globulin 2.4 1.5 - 4.5 g/dL    A/G Ratio 1.5 1.2 - 2.2    Total Bilirubin <0.2 0.0 - 1.2 mg/dL    Alkaline Phosphatase 60 44 - 121 IU/L    AST (SGOT) 16 0 - 40 IU/L    ALT (SGPT) 18 0 - 32 IU/L   Lipid Panel    Collection Time: 05/07/24  8:44 AM    Specimen: Arm, Right; Blood   Result Value Ref Range    Total Cholesterol 208 (H) 100 - 199 mg/dL    Triglycerides 159 (H) 0 - 149 mg/dL    HDL Cholesterol 50 >39 mg/dL    VLDL Cholesterol Narciso 28 5 - 40 mg/dL    LDL Chol Calc (NIH) 130 (H) 0 - 99 mg/dL   Hemoglobin A1c    Collection Time: 05/07/24  8:44 AM    Specimen: Arm, Right; Blood   Result Value Ref Range    Hemoglobin A1C 5.5 4.8 - 5.6 %   Magnesium    Collection Time: 05/07/24  8:44 AM    Specimen: Arm, Right; Blood   Result  Value Ref Range    Magnesium 1.7 1.6 - 2.3 mg/dL   CBC Auto Differential    Collection Time: 05/07/24  8:44 AM    Specimen: Arm, Right; Blood   Result Value Ref Range    WBC 10.2 3.4 - 10.8 x10E3/uL    RBC 3.71 (L) 3.77 - 5.28 x10E6/uL    Hemoglobin 11.7 11.1 - 15.9 g/dL    Hematocrit 35.7 34.0 - 46.6 %    MCV 96 79 - 97 fL    MCH 31.5 26.6 - 33.0 pg    MCHC 32.8 31.5 - 35.7 g/dL    RDW 12.5 11.7 - 15.4 %    Platelets 237 150 - 450 x10E3/uL    Neutrophil Rel % 81 Not Estab. %    Lymphocyte Rel % 10 Not Estab. %    Monocyte Rel % 7 Not Estab. %    Eosinophil Rel % 1 Not Estab. %    Basophil Rel % 0 Not Estab. %    Neutrophils Absolute 8.2 (H) 1.4 - 7.0 x10E3/uL    Lymphocytes Absolute 1.0 0.7 - 3.1 x10E3/uL    Monocytes Absolute 0.7 0.1 - 0.9 x10E3/uL    Eosinophils Absolute 0.1 0.0 - 0.4 x10E3/uL    Basophils Absolute 0.0 0.0 - 0.2 x10E3/uL    Immature Granulocyte Rel % 1 Not Estab. %    Immature Grans Absolute 0.1 0.0 - 0.1 x10E3/uL       Diagnoses and all orders for this visit:    1. Gastroenteritis (Primary)  -     ondansetron ODT (ZOFRAN-ODT) 8 MG disintegrating tablet; Place 1 tablet on the tongue Every 8 (Eight) Hours As Needed for Nausea or Vomiting for up to 5 days.  Dispense: 15 tablet; Refill: 0          FOLLOW-UP  As discussed during visit with PCP/[facility] Care if no improvement or Urgent Care/Emergency Department if worsening of symptoms    Patient verbalizes understanding of medication dosage, comfort measures, instructions for treatment and follow-up.    GENEVA Guerra  04/13/2025  05:17 EDT    Mode of Visit: Video  Location of patient: -HOME-  Location of provider: +HOME+  You have chosen to receive care through a telehealth visit.  The patient has signed the video visit consent form.  The visit included audio and video interaction. No technical issues occurred during this visit.      The use of a video visit has been reviewed with the patient and verbal informed consent has been obtained.  Myself and Fanny Pizano participated in this visit. The patient is located in [address] [place] [address].   I am located in [place], [place]. [employer] and [employer] were utilized. I spent 10 minutes in the patient's chart for this visit.         Note Disclaimer: At [facility], we believe that sharing information builds trust and better   relationships. You are receiving this note because you recently visited [facility]. It is possible you   will see health information before a provider has talked with you about it. This kind of information can   be easy to misunderstand. To help you fully understand what it means for your health, we urge you to   discuss this note with your provider.

## 2025-04-17 ENCOUNTER — OFFICE VISIT (OUTPATIENT)
Dept: FAMILY MEDICINE CLINIC | Facility: CLINIC | Age: 35
End: 2025-04-17
Payer: COMMERCIAL

## 2025-04-17 VITALS
HEIGHT: 67 IN | DIASTOLIC BLOOD PRESSURE: 90 MMHG | WEIGHT: 233.9 LBS | TEMPERATURE: 98 F | SYSTOLIC BLOOD PRESSURE: 120 MMHG | HEART RATE: 99 BPM | OXYGEN SATURATION: 98 % | BODY MASS INDEX: 36.71 KG/M2

## 2025-04-17 DIAGNOSIS — E66.9 OBESITY (BMI 30-39.9): ICD-10-CM

## 2025-04-17 DIAGNOSIS — R19.7 DIARRHEA, UNSPECIFIED TYPE: Primary | ICD-10-CM

## 2025-04-17 PROCEDURE — 99214 OFFICE O/P EST MOD 30 MIN: CPT | Performed by: FAMILY MEDICINE

## 2025-04-17 RX ORDER — SACCHAROMYCES BOULARDII 250 MG
250 CAPSULE ORAL 2 TIMES DAILY
Qty: 30 CAPSULE | Refills: 0 | Status: SHIPPED | OUTPATIENT
Start: 2025-04-17
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
## 2025-05-16 ENCOUNTER — OFFICE VISIT (OUTPATIENT)
Dept: FAMILY MEDICINE CLINIC | Facility: CLINIC | Age: 35
End: 2025-05-16
Payer: COMMERCIAL

## 2025-05-16 VITALS
BODY MASS INDEX: 37.18 KG/M2 | HEIGHT: 67 IN | OXYGEN SATURATION: 97 % | WEIGHT: 236.9 LBS | HEART RATE: 89 BPM | SYSTOLIC BLOOD PRESSURE: 122 MMHG | DIASTOLIC BLOOD PRESSURE: 98 MMHG

## 2025-05-16 DIAGNOSIS — N89.8 VAGINAL DISCHARGE: ICD-10-CM

## 2025-05-16 DIAGNOSIS — E66.9 OBESITY (BMI 30-39.9): Primary | ICD-10-CM

## 2025-05-16 DIAGNOSIS — Z00.00 ENCOUNTER FOR WELLNESS EXAMINATION IN ADULT: ICD-10-CM

## 2025-05-16 DIAGNOSIS — F32.1 CURRENT MODERATE EPISODE OF MAJOR DEPRESSIVE DISORDER WITHOUT PRIOR EPISODE: ICD-10-CM

## 2025-05-16 RX ORDER — SEMAGLUTIDE 0.5 MG/.5ML
0.5 INJECTION, SOLUTION SUBCUTANEOUS WEEKLY
Qty: 2 ML | Refills: 0 | Status: SHIPPED | OUTPATIENT
Start: 2025-06-15

## 2025-05-16 RX ORDER — SEMAGLUTIDE 0.25 MG/.5ML
0.25 INJECTION, SOLUTION SUBCUTANEOUS WEEKLY
Qty: 2 ML | Refills: 0 | Status: SHIPPED | OUTPATIENT
Start: 2025-05-16

## 2025-05-16 RX ORDER — BUPROPION HYDROCHLORIDE 150 MG/1
150 TABLET ORAL DAILY
Qty: 90 TABLET | Refills: 1 | Status: SHIPPED | OUTPATIENT
Start: 2025-05-16

## 2025-05-16 NOTE — PROGRESS NOTES
Office Note     Name: Fanny Pizano    : 1990     MRN: [id number]     Chief Complaint  Anxiety (Discuss medication options ) and Obesity (Discuss glp1)    Subjective     History of Present Illness:  Fanny Pizano is a 34 y.o. 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  Has noticed some increase in discharge but not necessarily consistent with yeast infection  - Has tried using over-the-counter vaginal lubrication during intercourse which is marginally helpful    Past Medical History:   Past Medical History:   Diagnosis Date    Allergic     Seasonal/Nickel    Anxiety     Headache     Hyperlipidemia     Kidney stone     Obesity     Strabismus     Tobacco dependence syndrome     Visual impairment        Past Surgical History:   Past Surgical History:   Procedure Laterality Date    APPENDECTOMY  2020     SECTION  2024    EYE SURGERY      SALPINGECTOMY Bilateral     For birth control and decreased risk of ovarian cancer    STRABISMUS SURGERY         Immunizations:   Immunization History   Administered Date(s) Administered    COVID-19 (MODERNA) 1st,2nd,3rd Dose Monovalent 2021, 2021    DTaP 1990, 1988, 1989, 1989, 1993    Fluzone (or Fluarix & Flulaval for VFC) >6mos 2022    Hepatitis B Adult/Adolescent IM 1999, 1999, 2000    IPV 1988, 1988, 1988, 1993    Influenza, Unspecified  01/10/2019, 2022, 10/08/2022    MMR 1989, 1999    Td (TDVAX) 2003    Tdap 2024    Varicella 1995        Medications:     Current Outpatient Medications:     buPROPion XL (Wellbutrin XL) 150 MG 24 hr tablet, Take 1 tablet by mouth Daily., Disp: 90 tablet, Rfl: 1    saccharomyces boulardii (Florastor) 250 MG capsule, Take 1 capsule by mouth 2 (Two) Times a Day. (Patient not taking: Reported on 2025), Disp: 30 capsule, Rfl: 0    Semaglutide-Weight Management (Wegovy) 0.25 MG/0.5ML solution auto-injector, Inject 0.5 mL under the skin into the appropriate area as directed 1 (One) Time Per Week., Disp: 2 mL, Rfl: 0    [START ON 6/15/2025] Semaglutide-Weight Management (Wegovy) 0.5 MG/0.5ML solution auto-injector, Inject 0.5 mL under the skin into the appropriate area as directed 1 (One) Time Per Week., Disp: 2 mL, Rfl: 0    Allergies:   Allergies   Allergen Reactions    Nickel Itching       Family History:   Family History   Problem Relation Age of Onset    Graves' disease Mother     Anxiety disorder Mother     Depression Mother     Diabetes Mother     Hyperlipidemia Mother     Thyroid disease Mother         Graves Disease    Breast cancer Paternal Grandmother     Cancer Paternal Grandmother         Breast    Coronary artery disease Other     Diabetes Other     Cancer Father         Stomach    Vision loss Father         Histoplasmosis    Cancer Maternal Grandmother         Breast/lung    Hyperlipidemia Maternal Grandmother     Thyroid disease Maternal Grandmother         Low functioning thyroid    Alcohol abuse Paternal Grandfather        Social History:   Social History     Socioeconomic History    Marital status:    Tobacco Use    Smoking status: Former     Current packs/day: 0.00     Average packs/day: 1 pack/day for 13.0 years (13.0 ttl pk-yrs)     Types: Cigarettes     Start date: 2005     Quit date: 2018     Years since quittin.4    Smokeless tobacco: Never  "  Vaping Use    Vaping status: Former    Start date: 6/14/2023    Quit date: 6/13/2024    Substances: Nicotine    Devices: Disposable   Substance and Sexual Activity    Alcohol use: Not Currently    Drug use: Never    Sexual activity: Yes     Partners: Male         Objective     Vital Signs  /98 (BP Location: Right arm, Patient Position: Sitting, Cuff Size: Adult)   Pulse 89   Ht 170.2 cm (67.01\")   Wt 107 kg (236 lb 14.4 oz)   SpO2 97%   BMI 37.09 kg/m²   Estimated body mass index is 37.09 kg/m² as calculated from the following:    Height as of this encounter: 170.2 cm (67.01\").    Weight as of this encounter: 107 kg (236 lb 14.4 oz).          Physical Exam  Constitutional:       General: She is not in acute distress.     Appearance: Normal appearance.   HENT:      Head: Normocephalic and atraumatic.   Eyes:      Conjunctiva/sclera: Conjunctivae normal.   Cardiovascular:      Rate and Rhythm: Normal rate and regular rhythm.   Pulmonary:      Effort: Pulmonary effort is normal.      Breath sounds: Normal breath sounds.   Neurological:      Mental Status: She is alert.   Psychiatric:         Mood and Affect: Mood normal.         Behavior: Behavior normal.         Thought Content: Thought content normal.          Assessment and Plan     Diagnoses and all orders for this visit:    1. Obesity (BMI 30-39.9) (Primary)  -     Semaglutide-Weight Management (Wegovy) 0.25 MG/0.5ML solution auto-injector; Inject 0.5 mL under the skin into the appropriate area as directed 1 (One) Time Per Week.  Dispense: 2 mL; Refill: 0  -     Semaglutide-Weight Management (Wegovy) 0.5 MG/0.5ML solution auto-injector; Inject 0.5 mL under the skin into the appropriate area as directed 1 (One) Time Per Week.  Dispense: 2 mL; Refill: 0    2. Current moderate episode of major depressive disorder without prior episode  -     buPROPion XL (Wellbutrin XL) 150 MG 24 hr tablet; Take 1 tablet by mouth Daily.  Dispense: 90 tablet; Refill: " 1    3. Encounter for wellness examination in adult  -     Comprehensive Metabolic Panel  -     Hemoglobin A1c  -     Lipid Panel  -     Vitamin D,25-Hydroxy  -     CBC w AUTO Differential  -     TSH Rfx On Abnormal To Free T4    4. Vaginal discharge  -     NuSwab VG+ - Swab, Vagina; Future  -     NuSwab VG+ - Swab, Vagina    Today we discussed mood.  She reports concern for anxiety and after our conversation some of her symptoms are consistent with depression as well.  Will start Wellbutrin.  May do better with this compared to SSRIs to avoid sexual side effects and weight gain.  She was counseled on risks and benefits of this medication.    Also did discuss weight today.  Discussed various medication options available to help with weight loss.  She would like to pursue Wegovy.  Counseled on risk and benefits of this medication.  No history of medullary thyroid cancer.    Also discussed dyspareunia today.  She has had increased discharge but no other symptoms.  Will get a  vaginitis panel to rule out infection as contributory cause.  Discussed Replens as a vaginal moisturizer.  Continue use of lubrication.  If symptoms persist will plan for pelvic exam.  Daughter was with her at her appointment today so deferred.  She will be returning in 4 weeks for an annual physical and this may be a better time         Follow Up  Return in about 4 weeks (around 6/13/2025) for Annual physical.    DO LUCIANA Moses [facility]  [address] [place] [id number] [phone]    NOTE TO PATIENT: The 21st Century Cures Act makes medical notes like these available to patients in the interest of transparency. However, be advised this is a medical document. It is intended as peer to peer communication. It is written in medical language and may contain abbreviations or verbiage that are unfamiliar. It may appear blunt or direct. Medical documents are intended to carry relevant  information, facts as evident, and the clinical opinion of the practitioner.

## 2025-05-17 LAB
25(OH)D3+25(OH)D2 SERPL-MCNC: 31.2 NG/ML (ref 30–100)
ALBUMIN SERPL-MCNC: 4.3 G/DL (ref 3.9–4.9)
ALP SERPL-CCNC: 87 IU/L (ref 44–121)
ALT SERPL-CCNC: 20 IU/L (ref 0–32)
AST SERPL-CCNC: 18 IU/L (ref 0–40)
BASOPHILS # BLD AUTO: 0.1 X10E3/UL (ref 0–0.2)
BASOPHILS NFR BLD AUTO: 1 %
BILIRUB SERPL-MCNC: <0.2 MG/DL (ref 0–1.2)
BUN SERPL-MCNC: 10 MG/DL (ref 6–20)
BUN/CREAT SERPL: 12 (ref 9–23)
CALCIUM SERPL-MCNC: 8.9 MG/DL (ref 8.7–10.2)
CHLORIDE SERPL-SCNC: 103 MMOL/L (ref 96–106)
CHOLEST SERPL-MCNC: 206 MG/DL (ref 100–199)
CO2 SERPL-SCNC: 20 MMOL/L (ref 20–29)
CREAT SERPL-MCNC: 0.85 MG/DL (ref 0.57–1)
EGFRCR SERPLBLD CKD-EPI 2021: 91 ML/MIN/1.73
EOSINOPHIL # BLD AUTO: 0.1 X10E3/UL (ref 0–0.4)
EOSINOPHIL NFR BLD AUTO: 1 %
ERYTHROCYTE [DISTWIDTH] IN BLOOD BY AUTOMATED COUNT: 12.6 % (ref 11.7–15.4)
GLOBULIN SER CALC-MCNC: 2.4 G/DL (ref 1.5–4.5)
GLUCOSE SERPL-MCNC: 96 MG/DL (ref 70–99)
HBA1C MFR BLD: 6 % (ref 4.8–5.6)
HCT VFR BLD AUTO: 43.8 % (ref 34–46.6)
HDLC SERPL-MCNC: 42 MG/DL
HGB BLD-MCNC: 13.9 G/DL (ref 11.1–15.9)
IMM GRANULOCYTES # BLD AUTO: 0 X10E3/UL (ref 0–0.1)
IMM GRANULOCYTES NFR BLD AUTO: 0 %
LDLC SERPL CALC-MCNC: 144 MG/DL (ref 0–99)
LYMPHOCYTES # BLD AUTO: 2.2 X10E3/UL (ref 0.7–3.1)
LYMPHOCYTES NFR BLD AUTO: 23 %
MCH RBC QN AUTO: 30.4 PG (ref 26.6–33)
MCHC RBC AUTO-ENTMCNC: 31.7 G/DL (ref 31.5–35.7)
MCV RBC AUTO: 96 FL (ref 79–97)
MONOCYTES # BLD AUTO: 0.6 X10E3/UL (ref 0.1–0.9)
MONOCYTES NFR BLD AUTO: 6 %
NEUTROPHILS # BLD AUTO: 6.6 X10E3/UL (ref 1.4–7)
NEUTROPHILS NFR BLD AUTO: 69 %
PLATELET # BLD AUTO: 315 X10E3/UL (ref 150–450)
POTASSIUM SERPL-SCNC: 4.2 MMOL/L (ref 3.5–5.2)
PROT SERPL-MCNC: 6.7 G/DL (ref 6–8.5)
RBC # BLD AUTO: 4.57 X10E6/UL (ref 3.77–5.28)
SODIUM SERPL-SCNC: 138 MMOL/L (ref 134–144)
TRIGL SERPL-MCNC: 112 MG/DL (ref 0–149)
TSH SERPL DL<=0.005 MIU/L-ACNC: 1.54 UIU/ML (ref 0.45–4.5)
VLDLC SERPL CALC-MCNC: 20 MG/DL (ref 5–40)
WBC # BLD AUTO: 9.6 X10E3/UL (ref 3.4–10.8)

## 2025-05-20 ENCOUNTER — RESULTS FOLLOW-UP (OUTPATIENT)
Dept: FAMILY MEDICINE CLINIC | Facility: CLINIC | Age: 35
End: 2025-05-20
Payer: COMMERCIAL

## 2025-05-20 LAB
A VAGINAE DNA VAG QL NAA+PROBE: NORMAL SCORE
BVAB2 DNA VAG QL NAA+PROBE: NORMAL SCORE
C ALBICANS DNA VAG QL NAA+PROBE: NEGATIVE
C GLABRATA DNA VAG QL NAA+PROBE: NEGATIVE
C TRACH DNA SPEC QL NAA+PROBE: NEGATIVE
MEGA1 DNA VAG QL NAA+PROBE: NORMAL SCORE
N GONORRHOEA DNA VAG QL NAA+PROBE: NEGATIVE
T VAGINALIS DNA VAG QL NAA+PROBE: NEGATIVE

## 2025-05-24 ENCOUNTER — PRIOR AUTHORIZATION (OUTPATIENT)
Dept: FAMILY MEDICINE CLINIC | Facility: CLINIC | Age: 35
End: 2025-05-24
Payer: COMMERCIAL

## 2025-06-09 ENCOUNTER — OFFICE VISIT (OUTPATIENT)
Dept: FAMILY MEDICINE CLINIC | Facility: CLINIC | Age: 35
End: 2025-06-09
Payer: COMMERCIAL

## 2025-06-09 VITALS
SYSTOLIC BLOOD PRESSURE: 122 MMHG | RESPIRATION RATE: 16 BRPM | OXYGEN SATURATION: 98 % | TEMPERATURE: 98.6 F | BODY MASS INDEX: 37.25 KG/M2 | HEART RATE: 80 BPM | WEIGHT: 237.3 LBS | HEIGHT: 67 IN | DIASTOLIC BLOOD PRESSURE: 68 MMHG

## 2025-06-09 DIAGNOSIS — R10.9 RIGHT-SIDED ABDOMINAL PAIN OF UNKNOWN CAUSE: Primary | ICD-10-CM

## 2025-06-09 DIAGNOSIS — R19.7 DIARRHEA, UNSPECIFIED TYPE: ICD-10-CM

## 2025-06-09 PROBLEM — K37 APPENDICITIS: Status: RESOLVED | Noted: 2024-12-16 | Resolved: 2025-06-09

## 2025-06-09 PROBLEM — K35.80 ACUTE APPENDICITIS: Status: RESOLVED | Noted: 2020-03-03 | Resolved: 2025-06-09

## 2025-06-09 LAB
BILIRUB BLD-MCNC: NEGATIVE MG/DL
CLARITY, POC: CLEAR
COLOR UR: NORMAL
EXPIRATION DATE: NORMAL
GLUCOSE UR STRIP-MCNC: NEGATIVE MG/DL
KETONES UR QL: NEGATIVE
LEUKOCYTE EST, POC: NEGATIVE
Lab: NORMAL
NITRITE UR-MCNC: NEGATIVE MG/ML
PH UR: 6 [PH] (ref 5–8)
PROT UR STRIP-MCNC: NEGATIVE MG/DL
RBC # UR STRIP: NEGATIVE /UL
SP GR UR: 1.01 (ref 1–1.03)
UROBILINOGEN UR QL: NORMAL

## 2025-06-09 PROCEDURE — 99213 OFFICE O/P EST LOW 20 MIN: CPT

## 2025-06-09 PROCEDURE — 81003 URINALYSIS AUTO W/O SCOPE: CPT

## 2025-06-09 RX ORDER — SACCHAROMYCES BOULARDII 250 MG
250 CAPSULE ORAL 2 TIMES DAILY
Qty: 30 CAPSULE | Refills: 2 | Status: SHIPPED | OUTPATIENT
Start: 2025-06-09

## 2025-06-09 NOTE — PATIENT INSTRUCTIONS
MiraLAX for IBS/constipation predominant    Bowel cleanse with over-the-counter Miralax   Dissolve 5 capfuls into 32 ounces of sugar-free gatorade/powerade and drink 8 ounces every 30 minutes until gone.   Follow this with regular use of Miralax, titrating to achieve/maintain soft/formed bowel movements (Type IV on [place] Stool Scale).   Miralax is safe to use long-term.   Repeat the bowel cleanse as needed but goal is consistent daily BM without need to repeat cleanse      Once regular bowel movements achieved:  Include 25-30 grams of fiber daily.    Best to get fiber from high-fiber foods but you may use Fibercon tablets to supplement as needed.   Fibercon is less likely to cause gas/bloating.    Drink 64-80 ounces of water daily, with ideally half of that containing electrolytes (sugar free gatorade/powerade, electrolyte tablets)   Exercise is important for maintaining healthy bowel habits.     Exercise Recommendations:   - Work up to goal of 30 min of walking per day - may split into 15 minute intervals if feasible  - Walking speed should be faster than a casual stroll but still able to carry on normal conversation         Health Maintenance, Female  Adopting a healthy lifestyle and getting preventive care can go a long way to promote health and wellness. Talk with your health care provider about what schedule of regular examinations is right for you. This is a good chance for you to check in with your provider about disease prevention and staying healthy.  In between checkups, there are plenty of things you can do on your own. Experts have done a lot of research about which lifestyle changes and preventive measures are most likely to keep you healthy. Ask your health care provider for more information.  Weight and diet  Eat a healthy diet  Be sure to include plenty of vegetables, fruits, low-fat dairy products, and lean protein.  Do not eat a lot of foods high in solid fats, added sugars, or salt.  Get regular  exercise. This is one of the most important things you can do for your health.  Most adults should exercise for at least 150 minutes each week. The exercise should increase your heart rate and make you sweat (moderate-intensity exercise).  Most adults should also do strengthening exercises at least twice a week. This is in addition to the moderate-intensity exercise.     Maintain a healthy weight  Body mass index (BMI) is a measurement that can be used to identify possible weight problems. It estimates body fat based on height and weight. Your health care provider can help determine your BMI and help you achieve or maintain a healthy weight.  For females 20 years of age and older:  A BMI below 18.5 is considered underweight.  A BMI of 18.5 to 24.9 is normal.  A BMI of 25 to 29.9 is considered overweight.  A BMI of 30 and above is considered obese.     Watch levels of cholesterol and blood lipids  You should start having your blood tested for lipids and cholesterol at 20 years of age, then have this test every 5 years.  You may need to have your cholesterol levels checked more often if:  Your lipid or cholesterol levels are high.  You are older than 50 years of age.  You are at high risk for heart disease.     Cancer screening  Lung Cancer  Lung cancer screening is recommended for adults 55-80 years old who are at high risk for lung cancer because of a history of smoking.  A yearly low-dose CT scan of the lungs is recommended for people who:  Currently smoke.  Have quit within the past 15 years.  Have at least a 30-pack-year history of smoking. A pack year is smoking an average of one pack of cigarettes a day for 1 year.  Yearly screening should continue until it has been 15 years since you quit.  Yearly screening should stop if you develop a health problem that would prevent you from having lung cancer treatment.     Breast Cancer  Practice breast self-awareness. This means understanding how your breasts normally  appear and feel.  It also means doing regular breast self-exams. Let your health care provider know about any changes, no matter how small.  If you are in your 20s or 30s, you should have a clinical breast exam (CBE) by a health care provider every 1-3 years as part of a regular health exam.  If you are 40 or older, have a CBE every year. Also consider having a breast X-ray (mammogram) every year.  If you have a family history of breast cancer, talk to your health care provider about genetic screening.  If you are at high risk for breast cancer, talk to your health care provider about having an MRI and a mammogram every year.  Breast cancer gene (BRCA) assessment is recommended for women who have family members with BRCA-related cancers. BRCA-related cancers include:  Breast.  Ovarian.  Tubal.  Peritoneal cancers.  Results of the assessment will determine the need for genetic counseling and BRCA1 and BRCA2 testing.     Cervical Cancer  Your health care provider may recommend that you be screened regularly for cancer of the pelvic organs (ovaries, uterus, and vagina). This screening involves a pelvic examination, including checking for microscopic changes to the surface of your cervix (Pap test). You may be encouraged to have this screening done every 3 years, beginning at age 21.  For women ages 30-65, health care providers may recommend pelvic exams and Pap testing every 3 years, or they may recommend the Pap and pelvic exam, combined with testing for human papilloma virus (HPV), every 5 years. Some types of HPV increase your risk of cervical cancer. Testing for HPV may also be done on women of any age with unclear Pap test results.  Other health care providers may not recommend any screening for nonpregnant women who are considered low risk for pelvic cancer and who do not have symptoms. Ask your health care provider if a screening pelvic exam is right for you.  If you have had past treatment for cervical cancer  or a condition that could lead to cancer, you need Pap tests and screening for cancer for at least 20 years after your treatment. If Pap tests have been discontinued, your risk factors (such as having a new sexual partner) need to be reassessed to determine if screening should resume. Some women have medical problems that increase the chance of getting cervical cancer. In these cases, your health care provider may recommend more frequent screening and Pap tests.     Colorectal Cancer  This type of cancer can be detected and often prevented.  Routine colorectal cancer screening usually begins at 50 years of age and continues through 75 years of age.  Your health care provider may recommend screening at an earlier age if you have risk factors for colon cancer.  Your health care provider may also recommend using home test kits to check for hidden blood in the stool.  A small camera at the end of a tube can be used to examine your colon directly (sigmoidoscopy or colonoscopy). This is done to check for the earliest forms of colorectal cancer.  Routine screening usually begins at age 50.  Direct examination of the colon should be repeated every 5-10 years through 75 years of age. However, you may need to be screened more often if early forms of precancerous polyps or small growths are found.     Skin Cancer  Check your skin from head to toe regularly.  Tell your health care provider about any new moles or changes in moles, especially if there is a change in a mole's shape or color.  Also tell your health care provider if you have a mole that is larger than the size of a pencil eraser.  Always use sunscreen. Apply sunscreen liberally and repeatedly throughout the day.  Protect yourself by wearing long sleeves, pants, a wide-brimmed hat, and sunglasses whenever you are outside.     Heart disease, diabetes, and high blood pressure  High blood pressure causes heart disease and increases the risk of stroke. High blood  pressure is more likely to develop in:  People who have blood pressure in the high end of the normal range (130-139/85-89 mm Hg).  People who are overweight or obese.  People who are .  If you are 18-39 years of age, have your blood pressure checked every 3-5 years. If you are 40 years of age or older, have your blood pressure checked every year. You should have your blood pressure measured twice--once when you are at a hospital or clinic, and once when you are not at a hospital or clinic. Record the average of the two measurements. To check your blood pressure when you are not at a hospital or clinic, you can use:  An automated blood pressure machine at a pharmacy.  A home blood pressure monitor.  If you are between 55 years and 79 years old, ask your health care provider if you should take aspirin to prevent strokes.  Have regular diabetes screenings. This involves taking a blood sample to check your fasting blood sugar level.  If you are at a normal weight and have a low risk for diabetes, have this test once every three years after 45 years of age.  If you are overweight and have a high risk for diabetes, consider being tested at a younger age or more often.  Preventing infection  Hepatitis B  If you have a higher risk for hepatitis B, you should be screened for this virus. You are considered at high risk for hepatitis B if:  You were born in a country where hepatitis B is common. Ask your health care provider which countries are considered high risk.  Your parents were born in a high-risk country, and you have not been immunized against hepatitis B (hepatitis B vaccine).  You have HIV or AIDS.  You use needles to inject street drugs.  You live with someone who has hepatitis B.  You have had sex with someone who has hepatitis B.  You get hemodialysis treatment.  You take certain medicines for conditions, including cancer, organ transplantation, and autoimmune conditions.     Hepatitis C  Blood  testing is recommended for:  Everyone born from 1945 through 1965.  Anyone with known risk factors for hepatitis C.     Sexually transmitted infections (STIs)  You should be screened for sexually transmitted infections (STIs) including gonorrhea and chlamydia if:  You are sexually active and are younger than 24 years of age.  You are older than 24 years of age and your health care provider tells you that you are at risk for this type of infection.  Your sexual activity has changed since you were last screened and you are at an increased risk for chlamydia or gonorrhea. Ask your health care provider if you are at risk.  If you do not have HIV, but are at risk, it may be recommended that you take a prescription medicine daily to prevent HIV infection. This is called pre-exposure prophylaxis (PrEP). You are considered at risk if:  You are sexually active and do not regularly use condoms or know the HIV status of your partner(s).  You take drugs by injection.  You are sexually active with a partner who has HIV.     Talk with your health care provider about whether you are at high risk of being infected with HIV. If you choose to begin PrEP, you should first be tested for HIV. You should then be tested every 3 months for as long as you are taking PrEP.  Pregnancy  If you are premenopausal and you may become pregnant, ask your health care provider about preconception counseling.  If you may become pregnant, take 400 to 800 micrograms (mcg) of folic acid every day.  If you want to prevent pregnancy, talk to your health care provider about birth control (contraception).  Osteoporosis and menopause  Osteoporosis is a disease in which the bones lose minerals and strength with aging. This can result in serious bone fractures. Your risk for osteoporosis can be identified using a bone density scan.  If you are 65 years of age or older, or if you are at risk for osteoporosis and fractures, ask your health care provider if you  should be screened.  Ask your health care provider whether you should take a calcium or vitamin D supplement to lower your risk for osteoporosis.  Menopause may have certain physical symptoms and risks.  Hormone replacement therapy may reduce some of these symptoms and risks.  Talk to your health care provider about whether hormone replacement therapy is right for you.  Follow these instructions at home:  Schedule regular health, dental, and eye exams.  Stay current with your immunizations.  Do not use any tobacco products including cigarettes, chewing tobacco, or electronic cigarettes.  If you are pregnant, do not drink alcohol.  If you are breastfeeding, limit how much and how often you drink alcohol.  Limit alcohol intake to no more than 1 drink per day for nonpregnant women. One drink equals 12 ounces of beer, 5 ounces of wine, or 1½ ounces of hard liquor.  Do not use street drugs.  Do not share needles.  Ask your health care provider for help if you need support or information about quitting drugs.  Tell your health care provider if you often feel depressed.  Tell your health care provider if you have ever been abused or do not feel safe at home.  This information is not intended to replace advice given to you by your health care provider. Make sure you discuss any questions you have with your health care provider.  Document Released: 07/02/2012 Document Revised: 05/25/2017 Document Reviewed: 09/20/2016  [employer] Patient Education © 2018 [employer]

## 2025-06-09 NOTE — PROGRESS NOTES
Acute Office Visit      Date: 2025  Patient Name: Fanny Pizano  : 1990   MRN: [id number]     Chief Complaint   Patient presents with    Abdominal Pain     Right side going on about 5 days       History of Present Illness: Fanny Pizano is a 34 y.o. female who is here today with c/o RLQ pain    Abdominal Pain  Chronicity:  New  Onset:  1 to 4 weeks ago  Onset quality:  Gradual  Frequency:  Daily  Progression since onset:  Worsening  Episode duration:  Unknown  Pain location:  RLQ  Pain - numeric:  3/10  Pain severity:  Mild  Pain quality:  Aching and dull  Radiates to:  Does not radiate  Associated symptoms: no anorexia, no dysuria, no fever, no headaches, no myalgias, no nausea and no vomiting    Aggravated by:  Nothing  Relieved by:  Nothing  Treatments tried:  Nothing  Improvement on treatment:  No relief    8 months post , pain right side, no period this month. Both tubes have been removed. Possibly prolapse…. Pain left side of vagina during and after inter course.  Symptoms are: new.   Onset was 1 to 6 months.   Symptoms occur: constantly.  Symptoms include: abdominal pain and fatigue.   Pertinent negative symptoms include no anorexia, no joint pain, no change in stool, no chest pain, no chills, no congestion, no cough, no diaphoresis, no fever, no headaches, no joint swelling, no myalgias, no nausea, no neck pain, no numbness, no rash, no sore throat, no swollen glands, no dysuria, no vertigo, no visual change, no vomiting and no weakness.      Daily BM - soft formed  Poor daily fluid intake -recommended intake to at least 64 ounces per day and daily exercise  Pt prescribed Wegovy but has not started taking it yet  Difficult conception - unknown PCOS but did not see on ultrasound - got preganant on Metformin  Pts father had stomach cx - he was high functioning alcoholic  Mother has colon issues - chronic constipation - no CA or other issues     Subjective     Constitutional:  "no fever or night sweats.    HEENT:  no changes in vision or eye pain. no ear discharge, nose bleed or bleeding gums.   CV: no chest pain or palpitations.    Respiratory: no SOB or cough.    GI: no vomiting or diarrhea.   : no difficulty in urination or blood in urine.    MUSC: no muscle or joint pain.   Skin: no rashes or itching.    Neurologic: A&O x 3, no headaches or tremors.   Psych: No SI/HI     Current Outpatient Medications   Medication Instructions    buPROPion XL (WELLBUTRIN XL) 150 mg, Oral, Daily    saccharomyces boulardii (FLORASTOR) 250 mg, Oral, 2 Times Daily    Wegovy 0.25 mg, Subcutaneous, Weekly    [START ON 6/15/2025] Wegovy 0.5 mg, Subcutaneous, Weekly       Allergies   Allergen Reactions    Nickel Itching       Objective     Vitals:    06/09/25 1040   BP: 122/68   BP Location: Left arm   Patient Position: Sitting   Cuff Size: Large Adult   Pulse: 80   Resp: 16   Temp: 98.6 °F (37 °C)   TempSrc: Oral   SpO2: 98%   Weight: 108 kg (237 lb 4.8 oz)   Height: 170.2 cm (67.01\")   PainSc: 3    PainLoc: Abdomen       Body mass index is 37.16 kg/m².     Physical Exam  Vitals and nursing note reviewed.   Constitutional:       General: She is not in acute distress.  Eyes:      Pupils: Pupils are equal, round, and reactive to light.   Cardiovascular:      Rate and Rhythm: Normal rate and regular rhythm.   Pulmonary:      Effort: Pulmonary effort is normal.      Breath sounds: Normal breath sounds.   Abdominal:      Tenderness: There is abdominal tenderness in the periumbilical area.       Musculoskeletal:         General: Normal range of motion.   Skin:     General: Skin is warm and dry.   Neurological:      Mental Status: She is alert and oriented to person, place, and time.   Psychiatric:         Mood and Affect: Mood normal.         Common labs          5/16/2025    09:49   Common Labs   Glucose 96    BUN 10    Creatinine 0.85    Sodium 138    Potassium 4.2    Chloride 103    Calcium 8.9    Albumin 4.3  "   Total Bilirubin <0.2    Alkaline Phosphatase 87    AST (SGOT) 18    ALT (SGPT) 20    WBC 9.6    Hemoglobin 13.9    Hematocrit 43.8    Platelets 315    Total Cholesterol 206    Triglycerides 112    HDL Cholesterol 42    LDL Cholesterol  144    Hemoglobin A1C 6.0        Assessment / Plan         Right-sided abdominal pain of unknown cause  Pt c/o abdominal pain - right side (see diagram)  No fevers, chills, or constitutional symptoms other than tenderness at very specific site right mid abdomen to the right of umbilicus-see diagram  POC urinalysis negative for infection  Previous testing for BV and STI neg  Previous tx with PCP for dyspareunia and abdominal pain  History of renal calculi and pyelonephritis hospitalization  Patient does report previous diagnosis of hepatic steatosis  Denies current constipation or diarrhea    Orders:    POC Urinalysis Dipstick, Automated    US Abdomen Complete; Future    Diarrhea, unspecified type  Previous treatment with Dr. Costa for viral colitis  She was prescribed probiotic but never started taking it because pharmacy denied prescription recommending over-the-counter  We will reorder that today and patient agrees to start OTC if she is unable to get this covered by her insurance    Orders:    saccharomyces boulardii (Florastor) 250 MG capsule; Take 1 capsule by mouth 2 (Two) Times a Day.    Summary and Plan:  Abdominal ultrasound ordered  Continue previous recommendations for dyspareunia and keep follow-ups with OB/GYN  Keep previously scheduled appointment with PCP, Dr. Dickens scheduled 6/19/2025  Follow-up with clinic if no improvement or symptoms become worse      Follow Up:   Return for scheduled follow up, unless otherwise indicated..    Patient Education:   Reviewed medications, potential side effects and signs and symptoms to report.   Discussed risk vs benefits of treatment plan with patient including medications, labs, and imaging.    Patient education materials  attached to AVS and reviewed with patient during office visit today.   Addressed questions and concerns during visit. Patient verbalized understanding and agrees with tx plan.   Instructed to call the office with any questions and report to ER with any life-threatening symptoms.    [facility]EV[facility]  [address] [facility] [id number] [phone]    NOTE TO PATIENT:   The 21st Century Cures Act makes medical notes like these available to patients in the interest of transparency. However, be advised this is a medical document. It is intended as peer to peer communication. It is written in medical language and may contain abbreviations or verbiage that are unfamiliar. It may appear blunt or direct. Medical documents are intended to carry relevant information, facts as evident, and the clinical opinion of the practitioner.     EMR Dragon/Transcription disclaimer:   Much of this encounter note is an electronic transcription of spoken language to printed text. Electronic transcription of spoken language may permit erroneous, or at times, nonsensical words or phrases to be inadvertently transcribed. Although I have reviewed the note for such errors, some may still exist.

## 2025-06-09 NOTE — ASSESSMENT & PLAN NOTE
Pt c/o abdominal pain - right side (see diagram)  No fevers, chills, or constitutional symptoms other than tenderness at very specific site right mid abdomen to the right of umbilicus-see diagram  POC urinalysis negative for infection  Previous testing for BV and STI neg  Previous tx with PCP for dyspareunia and abdominal pain  History of renal calculi and pyelonephritis hospitalization  Patient does report previous diagnosis of hepatic steatosis  Denies current constipation or diarrhea    Orders:    POC Urinalysis Dipstick, Automated    US Abdomen Complete; Future

## 2025-06-12 DIAGNOSIS — R10.2 PELVIC PAIN: Primary | ICD-10-CM

## 2025-06-19 ENCOUNTER — TELEMEDICINE (OUTPATIENT)
Dept: FAMILY MEDICINE CLINIC | Facility: CLINIC | Age: 35
End: 2025-06-19
Payer: COMMERCIAL

## 2025-06-19 DIAGNOSIS — B37.2 SKIN CANDIDIASIS: ICD-10-CM

## 2025-06-19 DIAGNOSIS — E78.2 MIXED HYPERLIPIDEMIA: Primary | ICD-10-CM

## 2025-06-19 DIAGNOSIS — L70.0 ACNE VULGARIS: ICD-10-CM

## 2025-06-19 DIAGNOSIS — F33.41 RECURRENT MAJOR DEPRESSIVE DISORDER, IN PARTIAL REMISSION: ICD-10-CM

## 2025-06-19 PROCEDURE — 99214 OFFICE O/P EST MOD 30 MIN: CPT | Performed by: STUDENT IN AN ORGANIZED HEALTH CARE EDUCATION/TRAINING PROGRAM

## 2025-06-19 RX ORDER — ATORVASTATIN CALCIUM 20 MG/1
20 TABLET, FILM COATED ORAL NIGHTLY
Qty: 90 TABLET | Refills: 3 | Status: SHIPPED | OUTPATIENT
Start: 2025-06-19

## 2025-06-19 RX ORDER — BUPROPION HYDROCHLORIDE 300 MG/1
300 TABLET ORAL DAILY
Qty: 90 TABLET | Refills: 1 | Status: SHIPPED | OUTPATIENT
Start: 2025-06-19

## 2025-06-19 RX ORDER — NYSTATIN 100000 U/G
1 CREAM TOPICAL 2 TIMES DAILY
Qty: 30 G | Refills: 1 | Status: SHIPPED | OUTPATIENT
Start: 2025-06-19

## 2025-06-19 NOTE — PROGRESS NOTES
"Chief Complaint  No chief complaint on file.    Subjective           Fanny Pizano presents to [facility]  History of Present Illness    ER follow up  Abdominal pain  -went to the ER, scan of abdomen, urine all okay per patient report (we do not have records)  -no additional episodes of pain  -thought to be an ovarian cyst     Obesity  -has not started wegovy yet due to abdominal pain issues   -plans to start next week     Anxiety  Depression  -feels like wellbutrin is doing well and has seen improvement.   -motivation has improved  -blood pressure back to normal     HLD  -previously on lipitor and did okay    Acne  -has noticed more frequent breakouts      Objective   Vital Signs:   There were no vitals taken for this visit.    Estimated body mass index is 37.16 kg/m² as calculated from the following:    Height as of 6/9/25: 170.2 cm (67.01\").    Weight as of 6/9/25: 108 kg (237 lb 4.8 oz).     Physical Exam   Constitutional: She appears well-developed and well-nourished.   Pulmonary/Chest: Effort normal.   Neurological: She is alert.   Psychiatric: She has a normal mood and affect.     Result Review :              Assessment and Plan      Diagnoses and all orders for this visit:    1. Mixed hyperlipidemia (Primary)  -     atorvastatin (LIPITOR) 20 MG tablet; Take 1 tablet by mouth Every Night.  Dispense: 90 tablet; Refill: 3    2. Recurrent major depressive disorder, in partial remission  -     buPROPion XL (Wellbutrin XL) 300 MG 24 hr tablet; Take 1 tablet by mouth Daily.  Dispense: 90 tablet; Refill: 1    3. Skin candidiasis  -     nystatin (MYCOSTATIN) 774623 UNIT/GM cream; Apply 1 Application topically to the appropriate area as directed 2 (Two) Times a Day. For up to 2 weeks  Dispense: 30 g; Refill: 1    4. Acne vulgaris    Statin as above for high cholesterol and we will recheck lipids in 6 months.  Depression is stable on Wellbutrin and we will continue this.  Also " discussed acne and she can try over-the-counter Differin.  Discussed importance of maintaining a routine of simple nonirritating skin care.    She has not had any further episodes of abdominal pain and I am suspicious that she had a ruptured ovarian cyst that caused the acute pain.  She will follow with gynecology.  Has not yet had pelvic ultrasound.    Follow Up     No follow-ups on file.  Patient was given instructions and counseling regarding her condition or for health maintenance advice. Please see specific information pulled into the AVS if appropriate.     Mode of Visit: Video  Location of patient: home  Location of provider: [facility]  You have chosen to receive care through a telehealth visit.  The patient has signed the video visit consent form.  The visit included audio and video interaction. No technical issues occurred during this visit.